# Patient Record
Sex: FEMALE | Race: WHITE | HISPANIC OR LATINO | Employment: OTHER | URBAN - METROPOLITAN AREA
[De-identification: names, ages, dates, MRNs, and addresses within clinical notes are randomized per-mention and may not be internally consistent; named-entity substitution may affect disease eponyms.]

---

## 2017-02-28 ENCOUNTER — APPOINTMENT (OUTPATIENT)
Dept: PHYSICAL THERAPY | Facility: CLINIC | Age: 57
End: 2017-02-28
Payer: COMMERCIAL

## 2017-02-28 PROCEDURE — 97140 MANUAL THERAPY 1/> REGIONS: CPT

## 2017-02-28 PROCEDURE — 97110 THERAPEUTIC EXERCISES: CPT

## 2017-02-28 PROCEDURE — 97162 PT EVAL MOD COMPLEX 30 MIN: CPT

## 2017-03-01 ENCOUNTER — APPOINTMENT (OUTPATIENT)
Dept: PHYSICAL THERAPY | Facility: CLINIC | Age: 57
End: 2017-03-01
Payer: COMMERCIAL

## 2017-03-01 PROCEDURE — 97140 MANUAL THERAPY 1/> REGIONS: CPT

## 2017-03-01 PROCEDURE — 97014 ELECTRIC STIMULATION THERAPY: CPT

## 2017-03-01 PROCEDURE — G0283 ELEC STIM OTHER THAN WOUND: HCPCS

## 2017-03-01 PROCEDURE — 97110 THERAPEUTIC EXERCISES: CPT

## 2017-03-03 ENCOUNTER — APPOINTMENT (OUTPATIENT)
Dept: PHYSICAL THERAPY | Facility: CLINIC | Age: 57
End: 2017-03-03
Payer: COMMERCIAL

## 2017-03-03 PROCEDURE — 97110 THERAPEUTIC EXERCISES: CPT

## 2017-03-03 PROCEDURE — 97112 NEUROMUSCULAR REEDUCATION: CPT

## 2017-03-03 PROCEDURE — G0283 ELEC STIM OTHER THAN WOUND: HCPCS

## 2017-03-03 PROCEDURE — 97140 MANUAL THERAPY 1/> REGIONS: CPT

## 2017-03-03 PROCEDURE — 97014 ELECTRIC STIMULATION THERAPY: CPT

## 2017-03-06 ENCOUNTER — APPOINTMENT (OUTPATIENT)
Dept: PHYSICAL THERAPY | Facility: CLINIC | Age: 57
End: 2017-03-06
Payer: COMMERCIAL

## 2017-03-06 PROCEDURE — 97110 THERAPEUTIC EXERCISES: CPT

## 2017-03-06 PROCEDURE — G0283 ELEC STIM OTHER THAN WOUND: HCPCS

## 2017-03-06 PROCEDURE — 97140 MANUAL THERAPY 1/> REGIONS: CPT

## 2017-03-06 PROCEDURE — 97014 ELECTRIC STIMULATION THERAPY: CPT

## 2017-03-06 PROCEDURE — 97112 NEUROMUSCULAR REEDUCATION: CPT

## 2017-03-08 ENCOUNTER — APPOINTMENT (OUTPATIENT)
Dept: PHYSICAL THERAPY | Facility: CLINIC | Age: 57
End: 2017-03-08
Payer: COMMERCIAL

## 2017-03-08 PROCEDURE — 97140 MANUAL THERAPY 1/> REGIONS: CPT

## 2017-03-08 PROCEDURE — 97110 THERAPEUTIC EXERCISES: CPT

## 2017-03-08 PROCEDURE — 97112 NEUROMUSCULAR REEDUCATION: CPT

## 2017-03-08 PROCEDURE — 97014 ELECTRIC STIMULATION THERAPY: CPT

## 2017-03-08 PROCEDURE — G0283 ELEC STIM OTHER THAN WOUND: HCPCS

## 2017-03-09 ENCOUNTER — APPOINTMENT (OUTPATIENT)
Dept: PHYSICAL THERAPY | Facility: CLINIC | Age: 57
End: 2017-03-09
Payer: COMMERCIAL

## 2017-03-09 PROCEDURE — 97112 NEUROMUSCULAR REEDUCATION: CPT

## 2017-03-09 PROCEDURE — 97110 THERAPEUTIC EXERCISES: CPT

## 2017-03-09 PROCEDURE — G0283 ELEC STIM OTHER THAN WOUND: HCPCS

## 2017-03-09 PROCEDURE — 97140 MANUAL THERAPY 1/> REGIONS: CPT

## 2017-03-09 PROCEDURE — 97014 ELECTRIC STIMULATION THERAPY: CPT

## 2017-03-13 ENCOUNTER — APPOINTMENT (OUTPATIENT)
Dept: PHYSICAL THERAPY | Facility: CLINIC | Age: 57
End: 2017-03-13
Payer: COMMERCIAL

## 2017-03-13 PROCEDURE — 97014 ELECTRIC STIMULATION THERAPY: CPT

## 2017-03-13 PROCEDURE — 97110 THERAPEUTIC EXERCISES: CPT

## 2017-03-13 PROCEDURE — G0283 ELEC STIM OTHER THAN WOUND: HCPCS

## 2017-03-13 PROCEDURE — 97112 NEUROMUSCULAR REEDUCATION: CPT

## 2017-03-13 PROCEDURE — 97140 MANUAL THERAPY 1/> REGIONS: CPT

## 2017-03-15 ENCOUNTER — APPOINTMENT (OUTPATIENT)
Dept: PHYSICAL THERAPY | Facility: CLINIC | Age: 57
End: 2017-03-15
Payer: COMMERCIAL

## 2017-03-15 PROCEDURE — 97140 MANUAL THERAPY 1/> REGIONS: CPT

## 2017-03-15 PROCEDURE — 97110 THERAPEUTIC EXERCISES: CPT

## 2017-03-15 PROCEDURE — 97112 NEUROMUSCULAR REEDUCATION: CPT

## 2017-03-15 PROCEDURE — 97014 ELECTRIC STIMULATION THERAPY: CPT

## 2017-03-15 PROCEDURE — G0283 ELEC STIM OTHER THAN WOUND: HCPCS

## 2017-03-16 ENCOUNTER — APPOINTMENT (OUTPATIENT)
Dept: PHYSICAL THERAPY | Facility: CLINIC | Age: 57
End: 2017-03-16
Payer: COMMERCIAL

## 2017-03-16 PROCEDURE — 97112 NEUROMUSCULAR REEDUCATION: CPT

## 2017-03-16 PROCEDURE — 97110 THERAPEUTIC EXERCISES: CPT

## 2017-03-16 PROCEDURE — 97140 MANUAL THERAPY 1/> REGIONS: CPT

## 2017-03-20 ENCOUNTER — APPOINTMENT (OUTPATIENT)
Dept: PHYSICAL THERAPY | Facility: CLINIC | Age: 57
End: 2017-03-20
Payer: COMMERCIAL

## 2017-03-20 PROCEDURE — 97110 THERAPEUTIC EXERCISES: CPT

## 2017-03-20 PROCEDURE — 97112 NEUROMUSCULAR REEDUCATION: CPT

## 2017-03-20 PROCEDURE — 97140 MANUAL THERAPY 1/> REGIONS: CPT

## 2017-03-22 ENCOUNTER — APPOINTMENT (OUTPATIENT)
Dept: PHYSICAL THERAPY | Facility: CLINIC | Age: 57
End: 2017-03-22
Payer: COMMERCIAL

## 2017-03-22 PROCEDURE — 97110 THERAPEUTIC EXERCISES: CPT

## 2017-03-22 PROCEDURE — 97112 NEUROMUSCULAR REEDUCATION: CPT

## 2017-03-23 ENCOUNTER — APPOINTMENT (OUTPATIENT)
Dept: PHYSICAL THERAPY | Facility: CLINIC | Age: 57
End: 2017-03-23
Payer: COMMERCIAL

## 2017-03-23 PROCEDURE — G0283 ELEC STIM OTHER THAN WOUND: HCPCS

## 2017-03-23 PROCEDURE — 97014 ELECTRIC STIMULATION THERAPY: CPT

## 2017-03-23 PROCEDURE — 97112 NEUROMUSCULAR REEDUCATION: CPT

## 2017-03-23 PROCEDURE — 97110 THERAPEUTIC EXERCISES: CPT

## 2017-03-27 ENCOUNTER — APPOINTMENT (OUTPATIENT)
Dept: PHYSICAL THERAPY | Facility: CLINIC | Age: 57
End: 2017-03-27
Payer: COMMERCIAL

## 2017-03-27 PROCEDURE — 97112 NEUROMUSCULAR REEDUCATION: CPT

## 2017-03-27 PROCEDURE — 97140 MANUAL THERAPY 1/> REGIONS: CPT

## 2017-03-27 PROCEDURE — 97014 ELECTRIC STIMULATION THERAPY: CPT

## 2017-03-27 PROCEDURE — G0283 ELEC STIM OTHER THAN WOUND: HCPCS

## 2017-03-27 PROCEDURE — 97110 THERAPEUTIC EXERCISES: CPT

## 2017-03-29 ENCOUNTER — APPOINTMENT (OUTPATIENT)
Dept: PHYSICAL THERAPY | Facility: CLINIC | Age: 57
End: 2017-03-29
Payer: COMMERCIAL

## 2017-03-29 PROCEDURE — 97112 NEUROMUSCULAR REEDUCATION: CPT

## 2017-03-29 PROCEDURE — 97140 MANUAL THERAPY 1/> REGIONS: CPT

## 2017-03-29 PROCEDURE — 97110 THERAPEUTIC EXERCISES: CPT

## 2017-03-29 PROCEDURE — G0283 ELEC STIM OTHER THAN WOUND: HCPCS

## 2017-03-29 PROCEDURE — 97014 ELECTRIC STIMULATION THERAPY: CPT

## 2017-03-30 ENCOUNTER — APPOINTMENT (OUTPATIENT)
Dept: PHYSICAL THERAPY | Facility: CLINIC | Age: 57
End: 2017-03-30
Payer: COMMERCIAL

## 2017-03-30 PROCEDURE — 97010 HOT OR COLD PACKS THERAPY: CPT

## 2017-03-30 PROCEDURE — 97110 THERAPEUTIC EXERCISES: CPT

## 2017-03-30 PROCEDURE — 97112 NEUROMUSCULAR REEDUCATION: CPT

## 2017-03-30 PROCEDURE — 97140 MANUAL THERAPY 1/> REGIONS: CPT

## 2017-04-03 ENCOUNTER — APPOINTMENT (OUTPATIENT)
Dept: PHYSICAL THERAPY | Facility: CLINIC | Age: 57
End: 2017-04-03
Payer: COMMERCIAL

## 2017-04-03 PROCEDURE — 97140 MANUAL THERAPY 1/> REGIONS: CPT

## 2017-04-03 PROCEDURE — 97010 HOT OR COLD PACKS THERAPY: CPT

## 2017-04-03 PROCEDURE — 97112 NEUROMUSCULAR REEDUCATION: CPT

## 2017-04-03 PROCEDURE — 97110 THERAPEUTIC EXERCISES: CPT

## 2017-04-05 ENCOUNTER — APPOINTMENT (OUTPATIENT)
Dept: PHYSICAL THERAPY | Facility: CLINIC | Age: 57
End: 2017-04-05
Payer: COMMERCIAL

## 2017-04-06 ENCOUNTER — APPOINTMENT (OUTPATIENT)
Dept: PHYSICAL THERAPY | Facility: CLINIC | Age: 57
End: 2017-04-06
Payer: COMMERCIAL

## 2017-04-06 PROCEDURE — 97140 MANUAL THERAPY 1/> REGIONS: CPT

## 2017-04-06 PROCEDURE — G0283 ELEC STIM OTHER THAN WOUND: HCPCS

## 2017-04-06 PROCEDURE — 97014 ELECTRIC STIMULATION THERAPY: CPT

## 2017-04-06 PROCEDURE — 97110 THERAPEUTIC EXERCISES: CPT

## 2017-04-06 PROCEDURE — 97112 NEUROMUSCULAR REEDUCATION: CPT

## 2017-04-10 ENCOUNTER — APPOINTMENT (OUTPATIENT)
Dept: PHYSICAL THERAPY | Facility: CLINIC | Age: 57
End: 2017-04-10
Payer: COMMERCIAL

## 2017-04-10 ENCOUNTER — APPOINTMENT (EMERGENCY)
Dept: CT IMAGING | Facility: HOSPITAL | Age: 57
DRG: 342 | End: 2017-04-10
Payer: COMMERCIAL

## 2017-04-10 ENCOUNTER — HOSPITAL ENCOUNTER (INPATIENT)
Facility: HOSPITAL | Age: 57
LOS: 1 days | Discharge: HOME | DRG: 342 | End: 2017-04-11
Attending: EMERGENCY MEDICINE | Admitting: SURGERY
Payer: COMMERCIAL

## 2017-04-10 DIAGNOSIS — K35.80 ACUTE APPENDICITIS: Primary | ICD-10-CM

## 2017-04-10 LAB
ALBUMIN SERPL BCP-MCNC: 3.8 G/DL (ref 3.5–5)
ALP SERPL-CCNC: 86 U/L (ref 46–116)
ALT SERPL W P-5'-P-CCNC: 24 U/L (ref 12–78)
ANION GAP SERPL CALCULATED.3IONS-SCNC: 7 MMOL/L (ref 4–13)
AST SERPL W P-5'-P-CCNC: 38 U/L (ref 5–45)
BACTERIA UR QL AUTO: ABNORMAL /HPF
BASOPHILS # BLD AUTO: 0.01 THOUSANDS/ΜL (ref 0–0.1)
BASOPHILS NFR BLD AUTO: 0 % (ref 0–1)
BILIRUB SERPL-MCNC: 0.9 MG/DL (ref 0.2–1)
BILIRUB UR QL STRIP: NEGATIVE
BUN SERPL-MCNC: 8 MG/DL (ref 5–25)
CALCIUM SERPL-MCNC: 9.3 MG/DL (ref 8.3–10.1)
CHLORIDE SERPL-SCNC: 101 MMOL/L (ref 100–108)
CLARITY UR: CLEAR
CLARITY, POC: CLEAR
CO2 SERPL-SCNC: 30 MMOL/L (ref 21–32)
COLOR UR: YELLOW
COLOR, POC: YELLOW
CREAT SERPL-MCNC: 0.6 MG/DL (ref 0.6–1.3)
EOSINOPHIL # BLD AUTO: 0.12 THOUSAND/ΜL (ref 0–0.61)
EOSINOPHIL NFR BLD AUTO: 1 % (ref 0–6)
ERYTHROCYTE [DISTWIDTH] IN BLOOD BY AUTOMATED COUNT: 14.6 % (ref 11.6–15.1)
EXT BILIRUBIN, UA: NEGATIVE
EXT BLOOD URINE: NORMAL
EXT GLUCOSE, UA: NEGATIVE
EXT KETONES: NEGATIVE
EXT NITRITE, UA: NORMAL
EXT PH, UA: 6
EXT PROTEIN, UA: NEGATIVE
EXT SPECIFIC GRAVITY, UA: 1.01
EXT UROBILINOGEN: 0.2
GFR SERPL CREATININE-BSD FRML MDRD: >60 ML/MIN/1.73SQ M
GLUCOSE SERPL-MCNC: 93 MG/DL (ref 65–140)
GLUCOSE UR STRIP-MCNC: NEGATIVE MG/DL
HCT VFR BLD AUTO: 42.2 % (ref 34.8–46.1)
HGB BLD-MCNC: 13.6 G/DL (ref 11.5–15.4)
HGB UR QL STRIP.AUTO: ABNORMAL
KETONES UR STRIP-MCNC: NEGATIVE MG/DL
LACTATE SERPL-SCNC: 1 MMOL/L (ref 0.5–2)
LEUKOCYTE ESTERASE UR QL STRIP: ABNORMAL
LYMPHOCYTES # BLD AUTO: 2.77 THOUSANDS/ΜL (ref 0.6–4.47)
LYMPHOCYTES NFR BLD AUTO: 29 % (ref 14–44)
MCH RBC QN AUTO: 25.8 PG (ref 26.8–34.3)
MCHC RBC AUTO-ENTMCNC: 32.2 G/DL (ref 31.4–37.4)
MCV RBC AUTO: 80 FL (ref 82–98)
MONOCYTES # BLD AUTO: 0.63 THOUSAND/ΜL (ref 0.17–1.22)
MONOCYTES NFR BLD AUTO: 7 % (ref 4–12)
NEUTROPHILS # BLD AUTO: 5.92 THOUSANDS/ΜL (ref 1.85–7.62)
NEUTS SEG NFR BLD AUTO: 63 % (ref 43–75)
NITRITE UR QL STRIP: NEGATIVE
NON-SQ EPI CELLS URNS QL MICRO: ABNORMAL /HPF
PH UR STRIP.AUTO: 6 [PH] (ref 4.5–8)
PLATELET # BLD AUTO: 335 THOUSANDS/UL (ref 149–390)
PMV BLD AUTO: 10.1 FL (ref 8.9–12.7)
POTASSIUM SERPL-SCNC: 4.5 MMOL/L (ref 3.5–5.3)
PROT SERPL-MCNC: 8.4 G/DL (ref 6.4–8.2)
PROT UR STRIP-MCNC: NEGATIVE MG/DL
RBC # BLD AUTO: 5.27 MILLION/UL (ref 3.81–5.12)
RBC #/AREA URNS AUTO: ABNORMAL /HPF
SODIUM SERPL-SCNC: 138 MMOL/L (ref 136–145)
SP GR UR STRIP.AUTO: <=1.005 (ref 1–1.03)
UROBILINOGEN UR QL STRIP.AUTO: 0.2 E.U./DL
WBC # BLD AUTO: 9.45 THOUSAND/UL (ref 4.31–10.16)
WBC # BLD EST: NEGATIVE 10*3/UL
WBC #/AREA URNS AUTO: ABNORMAL /HPF

## 2017-04-10 PROCEDURE — 96361 HYDRATE IV INFUSION ADD-ON: CPT

## 2017-04-10 PROCEDURE — 96374 THER/PROPH/DIAG INJ IV PUSH: CPT

## 2017-04-10 PROCEDURE — 36415 COLL VENOUS BLD VENIPUNCTURE: CPT | Performed by: EMERGENCY MEDICINE

## 2017-04-10 PROCEDURE — 83605 ASSAY OF LACTIC ACID: CPT | Performed by: EMERGENCY MEDICINE

## 2017-04-10 PROCEDURE — 81001 URINALYSIS AUTO W/SCOPE: CPT | Performed by: EMERGENCY MEDICINE

## 2017-04-10 PROCEDURE — 74177 CT ABD & PELVIS W/CONTRAST: CPT

## 2017-04-10 PROCEDURE — 80053 COMPREHEN METABOLIC PANEL: CPT | Performed by: EMERGENCY MEDICINE

## 2017-04-10 PROCEDURE — 87086 URINE CULTURE/COLONY COUNT: CPT | Performed by: EMERGENCY MEDICINE

## 2017-04-10 PROCEDURE — 96375 TX/PRO/DX INJ NEW DRUG ADDON: CPT

## 2017-04-10 PROCEDURE — 81002 URINALYSIS NONAUTO W/O SCOPE: CPT | Performed by: EMERGENCY MEDICINE

## 2017-04-10 PROCEDURE — 99285 EMERGENCY DEPT VISIT HI MDM: CPT

## 2017-04-10 PROCEDURE — 85025 COMPLETE CBC W/AUTO DIFF WBC: CPT | Performed by: EMERGENCY MEDICINE

## 2017-04-10 RX ORDER — SODIUM CHLORIDE 9 MG/ML
125 INJECTION, SOLUTION INTRAVENOUS CONTINUOUS
Status: DISCONTINUED | OUTPATIENT
Start: 2017-04-10 | End: 2017-04-11 | Stop reason: HOSPADM

## 2017-04-10 RX ORDER — ONDANSETRON 2 MG/ML
4 INJECTION INTRAMUSCULAR; INTRAVENOUS EVERY 4 HOURS PRN
Status: DISCONTINUED | OUTPATIENT
Start: 2017-04-10 | End: 2017-04-11 | Stop reason: HOSPADM

## 2017-04-10 RX ORDER — ONDANSETRON 2 MG/ML
4 INJECTION INTRAMUSCULAR; INTRAVENOUS ONCE
Status: COMPLETED | OUTPATIENT
Start: 2017-04-10 | End: 2017-04-10

## 2017-04-10 RX ORDER — MORPHINE SULFATE 2 MG/ML
2 INJECTION, SOLUTION INTRAMUSCULAR; INTRAVENOUS
Status: DISCONTINUED | OUTPATIENT
Start: 2017-04-10 | End: 2017-04-11 | Stop reason: HOSPADM

## 2017-04-10 RX ADMIN — HYDROMORPHONE HYDROCHLORIDE 1 MG: 1 INJECTION, SOLUTION INTRAMUSCULAR; INTRAVENOUS; SUBCUTANEOUS at 21:30

## 2017-04-10 RX ADMIN — SODIUM CHLORIDE 1000 ML: 0.9 INJECTION, SOLUTION INTRAVENOUS at 20:02

## 2017-04-10 RX ADMIN — METRONIDAZOLE 500 MG: 500 INJECTION, SOLUTION INTRAVENOUS at 23:04

## 2017-04-10 RX ADMIN — HYDROMORPHONE HYDROCHLORIDE 1 MG: 1 INJECTION, SOLUTION INTRAMUSCULAR; INTRAVENOUS; SUBCUTANEOUS at 20:05

## 2017-04-10 RX ADMIN — IOHEXOL 100 ML: 350 INJECTION, SOLUTION INTRAVENOUS at 20:55

## 2017-04-10 RX ADMIN — ONDANSETRON 4 MG: 2 INJECTION INTRAMUSCULAR; INTRAVENOUS at 20:02

## 2017-04-10 RX ADMIN — SODIUM CHLORIDE 125 ML/HR: 0.9 INJECTION, SOLUTION INTRAVENOUS at 22:16

## 2017-04-10 RX ADMIN — CEFAZOLIN SODIUM 2000 MG: 10 INJECTION, POWDER, FOR SOLUTION INTRAVENOUS at 21:36

## 2017-04-11 ENCOUNTER — ANESTHESIA EVENT (INPATIENT)
Dept: PERIOP | Facility: HOSPITAL | Age: 57
DRG: 342 | End: 2017-04-11
Payer: COMMERCIAL

## 2017-04-11 ENCOUNTER — ANESTHESIA (INPATIENT)
Dept: PERIOP | Facility: HOSPITAL | Age: 57
DRG: 342 | End: 2017-04-11
Payer: COMMERCIAL

## 2017-04-11 VITALS
SYSTOLIC BLOOD PRESSURE: 142 MMHG | RESPIRATION RATE: 18 BRPM | BODY MASS INDEX: 41.68 KG/M2 | TEMPERATURE: 98.6 F | HEIGHT: 63 IN | HEART RATE: 91 BPM | DIASTOLIC BLOOD PRESSURE: 82 MMHG | WEIGHT: 235.23 LBS | OXYGEN SATURATION: 95 %

## 2017-04-11 PROBLEM — K35.80 APPENDICITIS, ACUTE: Status: ACTIVE | Noted: 2017-04-11

## 2017-04-11 LAB
ANION GAP SERPL CALCULATED.3IONS-SCNC: 7 MMOL/L (ref 4–13)
BACTERIA UR CULT: NORMAL
BUN SERPL-MCNC: 9 MG/DL (ref 5–25)
CALCIUM SERPL-MCNC: 8.5 MG/DL (ref 8.3–10.1)
CHLORIDE SERPL-SCNC: 107 MMOL/L (ref 100–108)
CO2 SERPL-SCNC: 27 MMOL/L (ref 21–32)
CREAT SERPL-MCNC: 0.62 MG/DL (ref 0.6–1.3)
ERYTHROCYTE [DISTWIDTH] IN BLOOD BY AUTOMATED COUNT: 14.6 % (ref 11.6–15.1)
GFR SERPL CREATININE-BSD FRML MDRD: >60 ML/MIN/1.73SQ M
GLUCOSE SERPL-MCNC: 108 MG/DL (ref 65–140)
HCT VFR BLD AUTO: 34.7 % (ref 34.8–46.1)
HGB BLD-MCNC: 10.9 G/DL (ref 11.5–15.4)
MCH RBC QN AUTO: 25.6 PG (ref 26.8–34.3)
MCHC RBC AUTO-ENTMCNC: 31.4 G/DL (ref 31.4–37.4)
MCV RBC AUTO: 82 FL (ref 82–98)
PLATELET # BLD AUTO: 270 THOUSANDS/UL (ref 149–390)
PMV BLD AUTO: 10 FL (ref 8.9–12.7)
POTASSIUM SERPL-SCNC: 3.9 MMOL/L (ref 3.5–5.3)
RBC # BLD AUTO: 4.25 MILLION/UL (ref 3.81–5.12)
SODIUM SERPL-SCNC: 141 MMOL/L (ref 136–145)
WBC # BLD AUTO: 6.18 THOUSAND/UL (ref 4.31–10.16)

## 2017-04-11 PROCEDURE — 85027 COMPLETE CBC AUTOMATED: CPT | Performed by: PHYSICIAN ASSISTANT

## 2017-04-11 PROCEDURE — 0DTJ4ZZ RESECTION OF APPENDIX, PERCUTANEOUS ENDOSCOPIC APPROACH: ICD-10-PCS | Performed by: SURGERY

## 2017-04-11 PROCEDURE — 80048 BASIC METABOLIC PNL TOTAL CA: CPT | Performed by: PHYSICIAN ASSISTANT

## 2017-04-11 PROCEDURE — 88304 TISSUE EXAM BY PATHOLOGIST: CPT | Performed by: SURGERY

## 2017-04-11 RX ORDER — OXYCODONE HYDROCHLORIDE AND ACETAMINOPHEN 5; 325 MG/1; MG/1
2 TABLET ORAL EVERY 4 HOURS PRN
Status: DISCONTINUED | OUTPATIENT
Start: 2017-04-11 | End: 2017-04-11 | Stop reason: HOSPADM

## 2017-04-11 RX ORDER — PROPOFOL 10 MG/ML
INJECTION, EMULSION INTRAVENOUS AS NEEDED
Status: DISCONTINUED | OUTPATIENT
Start: 2017-04-11 | End: 2017-04-11 | Stop reason: SURG

## 2017-04-11 RX ORDER — MORPHINE SULFATE 4 MG/ML
4 INJECTION, SOLUTION INTRAMUSCULAR; INTRAVENOUS
Status: DISCONTINUED | OUTPATIENT
Start: 2017-04-11 | End: 2017-04-11 | Stop reason: HOSPADM

## 2017-04-11 RX ORDER — FENTANYL CITRATE/PF 50 MCG/ML
25 SYRINGE (ML) INJECTION
Status: DISCONTINUED | OUTPATIENT
Start: 2017-04-11 | End: 2017-04-11 | Stop reason: HOSPADM

## 2017-04-11 RX ORDER — LIDOCAINE HYDROCHLORIDE 10 MG/ML
INJECTION, SOLUTION INFILTRATION; PERINEURAL AS NEEDED
Status: DISCONTINUED | OUTPATIENT
Start: 2017-04-11 | End: 2017-04-11 | Stop reason: SURG

## 2017-04-11 RX ORDER — SODIUM CHLORIDE, SODIUM LACTATE, POTASSIUM CHLORIDE, CALCIUM CHLORIDE 600; 310; 30; 20 MG/100ML; MG/100ML; MG/100ML; MG/100ML
100 INJECTION, SOLUTION INTRAVENOUS CONTINUOUS
Status: DISCONTINUED | OUTPATIENT
Start: 2017-04-11 | End: 2017-04-11 | Stop reason: HOSPADM

## 2017-04-11 RX ORDER — OXYCODONE HYDROCHLORIDE AND ACETAMINOPHEN 5; 325 MG/1; MG/1
2 TABLET ORAL EVERY 4 HOURS PRN
Qty: 28 TABLET | Refills: 0 | Status: SHIPPED | OUTPATIENT
Start: 2017-04-11 | End: 2017-04-11

## 2017-04-11 RX ORDER — SODIUM CHLORIDE, SODIUM LACTATE, POTASSIUM CHLORIDE, CALCIUM CHLORIDE 600; 310; 30; 20 MG/100ML; MG/100ML; MG/100ML; MG/100ML
125 INJECTION, SOLUTION INTRAVENOUS CONTINUOUS
Status: DISCONTINUED | OUTPATIENT
Start: 2017-04-11 | End: 2017-04-11 | Stop reason: HOSPADM

## 2017-04-11 RX ORDER — ONDANSETRON 2 MG/ML
4 INJECTION INTRAMUSCULAR; INTRAVENOUS EVERY 4 HOURS PRN
Status: DISCONTINUED | OUTPATIENT
Start: 2017-04-11 | End: 2017-04-11 | Stop reason: SDUPTHER

## 2017-04-11 RX ORDER — FENTANYL CITRATE 50 UG/ML
INJECTION, SOLUTION INTRAMUSCULAR; INTRAVENOUS AS NEEDED
Status: DISCONTINUED | OUTPATIENT
Start: 2017-04-11 | End: 2017-04-11 | Stop reason: SURG

## 2017-04-11 RX ORDER — OXYCODONE HYDROCHLORIDE AND ACETAMINOPHEN 5; 325 MG/1; MG/1
2 TABLET ORAL EVERY 4 HOURS PRN
Qty: 20 TABLET | Refills: 0 | Status: SHIPPED | OUTPATIENT
Start: 2017-04-11 | End: 2019-07-19 | Stop reason: HOSPADM

## 2017-04-11 RX ORDER — BUPIVACAINE HYDROCHLORIDE AND EPINEPHRINE 2.5; 5 MG/ML; UG/ML
INJECTION, SOLUTION EPIDURAL; INFILTRATION; INTRACAUDAL; PERINEURAL AS NEEDED
Status: DISCONTINUED | OUTPATIENT
Start: 2017-04-11 | End: 2017-04-11 | Stop reason: HOSPADM

## 2017-04-11 RX ORDER — KETOROLAC TROMETHAMINE 30 MG/ML
INJECTION, SOLUTION INTRAMUSCULAR; INTRAVENOUS AS NEEDED
Status: DISCONTINUED | OUTPATIENT
Start: 2017-04-11 | End: 2017-04-11 | Stop reason: SURG

## 2017-04-11 RX ORDER — ONDANSETRON 2 MG/ML
INJECTION INTRAMUSCULAR; INTRAVENOUS AS NEEDED
Status: DISCONTINUED | OUTPATIENT
Start: 2017-04-11 | End: 2017-04-11 | Stop reason: SURG

## 2017-04-11 RX ORDER — ROCURONIUM BROMIDE 10 MG/ML
INJECTION, SOLUTION INTRAVENOUS AS NEEDED
Status: DISCONTINUED | OUTPATIENT
Start: 2017-04-11 | End: 2017-04-11 | Stop reason: SURG

## 2017-04-11 RX ORDER — ONDANSETRON 2 MG/ML
4 INJECTION INTRAMUSCULAR; INTRAVENOUS ONCE AS NEEDED
Status: DISCONTINUED | OUTPATIENT
Start: 2017-04-11 | End: 2017-04-11 | Stop reason: HOSPADM

## 2017-04-11 RX ORDER — GLYCOPYRROLATE 0.2 MG/ML
INJECTION INTRAMUSCULAR; INTRAVENOUS AS NEEDED
Status: DISCONTINUED | OUTPATIENT
Start: 2017-04-11 | End: 2017-04-11 | Stop reason: SURG

## 2017-04-11 RX ORDER — SODIUM CHLORIDE, SODIUM LACTATE, POTASSIUM CHLORIDE, CALCIUM CHLORIDE 600; 310; 30; 20 MG/100ML; MG/100ML; MG/100ML; MG/100ML
INJECTION, SOLUTION INTRAVENOUS CONTINUOUS PRN
Status: DISCONTINUED | OUTPATIENT
Start: 2017-04-11 | End: 2017-04-11 | Stop reason: SURG

## 2017-04-11 RX ORDER — ACETAMINOPHEN 325 MG/1
650 TABLET ORAL EVERY 6 HOURS PRN
Status: DISCONTINUED | OUTPATIENT
Start: 2017-04-11 | End: 2017-04-11 | Stop reason: HOSPADM

## 2017-04-11 RX ADMIN — SODIUM CHLORIDE, SODIUM LACTATE, POTASSIUM CHLORIDE, AND CALCIUM CHLORIDE: .6; .31; .03; .02 INJECTION, SOLUTION INTRAVENOUS at 14:54

## 2017-04-11 RX ADMIN — FENTANYL CITRATE 25 MCG: 50 INJECTION INTRAMUSCULAR; INTRAVENOUS at 16:47

## 2017-04-11 RX ADMIN — CEFAZOLIN SODIUM 2000 MG: 10 INJECTION, POWDER, FOR SOLUTION INTRAVENOUS at 05:03

## 2017-04-11 RX ADMIN — FENTANYL CITRATE 25 MCG: 50 INJECTION INTRAMUSCULAR; INTRAVENOUS at 16:37

## 2017-04-11 RX ADMIN — LIDOCAINE HYDROCHLORIDE 50 MG: 10 INJECTION, SOLUTION INFILTRATION; PERINEURAL at 15:04

## 2017-04-11 RX ADMIN — ONDANSETRON 4 MG: 2 INJECTION INTRAMUSCULAR; INTRAVENOUS at 14:13

## 2017-04-11 RX ADMIN — FENTANYL CITRATE 25 MCG: 50 INJECTION INTRAMUSCULAR; INTRAVENOUS at 17:00

## 2017-04-11 RX ADMIN — KETOROLAC TROMETHAMINE 30 MG: 30 INJECTION, SOLUTION INTRAMUSCULAR at 15:48

## 2017-04-11 RX ADMIN — FENTANYL CITRATE 50 MCG: 50 INJECTION INTRAMUSCULAR; INTRAVENOUS at 15:30

## 2017-04-11 RX ADMIN — CEFAZOLIN SODIUM 2000 MG: 2 SOLUTION INTRAVENOUS at 14:58

## 2017-04-11 RX ADMIN — DEXAMETHASONE SODIUM PHOSPHATE 4 MG: 10 INJECTION INTRAMUSCULAR; INTRAVENOUS at 15:10

## 2017-04-11 RX ADMIN — ROCURONIUM BROMIDE 50 MG: 10 INJECTION, SOLUTION INTRAVENOUS at 15:04

## 2017-04-11 RX ADMIN — PROPOFOL 200 MG: 10 INJECTION, EMULSION INTRAVENOUS at 15:04

## 2017-04-11 RX ADMIN — MORPHINE SULFATE 2 MG: 2 INJECTION, SOLUTION INTRAMUSCULAR; INTRAVENOUS at 01:46

## 2017-04-11 RX ADMIN — ONDANSETRON 4 MG: 2 INJECTION INTRAMUSCULAR; INTRAVENOUS at 15:10

## 2017-04-11 RX ADMIN — SODIUM CHLORIDE 125 ML/HR: 0.9 INJECTION, SOLUTION INTRAVENOUS at 07:44

## 2017-04-11 RX ADMIN — ACETAMINOPHEN 650 MG: 325 TABLET ORAL at 09:59

## 2017-04-11 RX ADMIN — FENTANYL CITRATE 50 MCG: 50 INJECTION INTRAMUSCULAR; INTRAVENOUS at 15:10

## 2017-04-11 RX ADMIN — NEOSTIGMINE METHYLSULFATE 4 MG: 1 INJECTION INTRAMUSCULAR; INTRAVENOUS; SUBCUTANEOUS at 15:48

## 2017-04-11 RX ADMIN — FENTANYL CITRATE 25 MCG: 50 INJECTION INTRAMUSCULAR; INTRAVENOUS at 16:50

## 2017-04-11 RX ADMIN — GLYCOPYRROLATE 0.8 MG: 0.2 INJECTION INTRAMUSCULAR; INTRAVENOUS at 15:48

## 2017-04-12 ENCOUNTER — APPOINTMENT (OUTPATIENT)
Dept: PHYSICAL THERAPY | Facility: CLINIC | Age: 57
End: 2017-04-12
Payer: COMMERCIAL

## 2017-04-13 ENCOUNTER — APPOINTMENT (OUTPATIENT)
Dept: PHYSICAL THERAPY | Facility: CLINIC | Age: 57
End: 2017-04-13
Payer: COMMERCIAL

## 2017-04-17 ENCOUNTER — APPOINTMENT (OUTPATIENT)
Dept: PHYSICAL THERAPY | Facility: CLINIC | Age: 57
End: 2017-04-17
Payer: COMMERCIAL

## 2017-04-19 ENCOUNTER — APPOINTMENT (OUTPATIENT)
Dept: PHYSICAL THERAPY | Facility: CLINIC | Age: 57
End: 2017-04-19
Payer: COMMERCIAL

## 2017-04-20 ENCOUNTER — APPOINTMENT (OUTPATIENT)
Dept: PHYSICAL THERAPY | Facility: CLINIC | Age: 57
End: 2017-04-20
Payer: COMMERCIAL

## 2017-04-24 ENCOUNTER — APPOINTMENT (OUTPATIENT)
Dept: PHYSICAL THERAPY | Facility: CLINIC | Age: 57
End: 2017-04-24
Payer: COMMERCIAL

## 2017-04-26 ENCOUNTER — APPOINTMENT (OUTPATIENT)
Dept: PHYSICAL THERAPY | Facility: CLINIC | Age: 57
End: 2017-04-26
Payer: COMMERCIAL

## 2017-04-27 ENCOUNTER — APPOINTMENT (OUTPATIENT)
Dept: PHYSICAL THERAPY | Facility: CLINIC | Age: 57
End: 2017-04-27
Payer: COMMERCIAL

## 2017-05-02 ENCOUNTER — APPOINTMENT (OUTPATIENT)
Dept: PHYSICAL THERAPY | Facility: CLINIC | Age: 57
End: 2017-05-02
Payer: COMMERCIAL

## 2017-05-02 PROCEDURE — 97140 MANUAL THERAPY 1/> REGIONS: CPT

## 2017-05-02 PROCEDURE — 97014 ELECTRIC STIMULATION THERAPY: CPT

## 2017-05-02 PROCEDURE — 97112 NEUROMUSCULAR REEDUCATION: CPT

## 2017-05-02 PROCEDURE — 97110 THERAPEUTIC EXERCISES: CPT

## 2017-05-02 PROCEDURE — G0283 ELEC STIM OTHER THAN WOUND: HCPCS

## 2017-05-09 ENCOUNTER — APPOINTMENT (OUTPATIENT)
Dept: PHYSICAL THERAPY | Facility: CLINIC | Age: 57
End: 2017-05-09
Payer: COMMERCIAL

## 2017-05-09 PROCEDURE — 97112 NEUROMUSCULAR REEDUCATION: CPT

## 2017-05-09 PROCEDURE — 97140 MANUAL THERAPY 1/> REGIONS: CPT

## 2017-05-09 PROCEDURE — 97110 THERAPEUTIC EXERCISES: CPT

## 2017-05-16 ENCOUNTER — APPOINTMENT (OUTPATIENT)
Dept: PHYSICAL THERAPY | Facility: CLINIC | Age: 57
End: 2017-05-16
Payer: COMMERCIAL

## 2017-05-16 PROCEDURE — 97110 THERAPEUTIC EXERCISES: CPT

## 2017-05-16 PROCEDURE — 97140 MANUAL THERAPY 1/> REGIONS: CPT

## 2017-05-23 ENCOUNTER — APPOINTMENT (OUTPATIENT)
Dept: PHYSICAL THERAPY | Facility: CLINIC | Age: 57
End: 2017-05-23
Payer: COMMERCIAL

## 2017-05-23 PROCEDURE — 97112 NEUROMUSCULAR REEDUCATION: CPT

## 2017-05-23 PROCEDURE — 97110 THERAPEUTIC EXERCISES: CPT

## 2019-07-15 ENCOUNTER — APPOINTMENT (EMERGENCY)
Dept: CT IMAGING | Facility: HOSPITAL | Age: 59
DRG: 076 | End: 2019-07-15
Payer: COMMERCIAL

## 2019-07-15 ENCOUNTER — HOSPITAL ENCOUNTER (INPATIENT)
Facility: HOSPITAL | Age: 59
LOS: 4 days | Discharge: NURSING, REHAB OR LONG-TERM CARE | DRG: 076 | End: 2019-07-19
Attending: EMERGENCY MEDICINE | Admitting: INTERNAL MEDICINE
Payer: COMMERCIAL

## 2019-07-15 ENCOUNTER — APPOINTMENT (EMERGENCY)
Dept: RADIOLOGY | Facility: HOSPITAL | Age: 59
DRG: 076 | End: 2019-07-15
Payer: COMMERCIAL

## 2019-07-15 DIAGNOSIS — B02.0 HERPES ZOSTER ENCEPHALITIS: ICD-10-CM

## 2019-07-15 DIAGNOSIS — A87.9 VIRAL MENINGITIS: ICD-10-CM

## 2019-07-15 DIAGNOSIS — B02.1 HERPES ZOSTER WITH MENINGITIS: ICD-10-CM

## 2019-07-15 DIAGNOSIS — R51.9 HEADACHE: ICD-10-CM

## 2019-07-15 DIAGNOSIS — B02.9 SHINGLES RASH: ICD-10-CM

## 2019-07-15 DIAGNOSIS — B02.9 HERPES ZOSTER: Primary | ICD-10-CM

## 2019-07-15 PROBLEM — G61.0 GUILLAIN BARRÉ SYNDROME (HCC): Status: ACTIVE | Noted: 2019-07-15

## 2019-07-15 PROBLEM — M43.6 NECK STIFFNESS: Status: ACTIVE | Noted: 2019-07-15

## 2019-07-15 PROBLEM — I10 ESSENTIAL HYPERTENSION: Chronic | Status: ACTIVE | Noted: 2019-07-15

## 2019-07-15 LAB
ALBUMIN SERPL BCP-MCNC: 3.8 G/DL (ref 3.5–5)
ALP SERPL-CCNC: 84 U/L (ref 46–116)
ALT SERPL W P-5'-P-CCNC: 17 U/L (ref 12–78)
ANION GAP SERPL CALCULATED.3IONS-SCNC: 8 MMOL/L (ref 4–13)
APPEARANCE CSF: ABNORMAL
APTT PPP: 29 SECONDS (ref 23–37)
AST SERPL W P-5'-P-CCNC: 14 U/L (ref 5–45)
BASOPHILS # BLD AUTO: 0.01 THOUSANDS/ΜL (ref 0–0.1)
BASOPHILS NFR BLD AUTO: 0 % (ref 0–1)
BILIRUB SERPL-MCNC: 0.8 MG/DL (ref 0.2–1)
BILIRUB UR QL STRIP: NEGATIVE
BUN SERPL-MCNC: 8 MG/DL (ref 5–25)
C GATTII+NEOFOR DNA CSF QL NAA+NON-PROBE: NOT DETECTED
CALCIUM SERPL-MCNC: 9.5 MG/DL (ref 8.3–10.1)
CHLORIDE SERPL-SCNC: 100 MMOL/L (ref 100–108)
CLARITY UR: CLEAR
CMV DNA CSF QL NAA+NON-PROBE: NOT DETECTED
CO2 SERPL-SCNC: 28 MMOL/L (ref 21–32)
COLOR UR: NORMAL
CREAT SERPL-MCNC: 0.9 MG/DL (ref 0.6–1.3)
DEPRECATED D DIMER PPP: 1356 NG/ML (FEU)
E COLI K1 DNA CSF QL NAA+NON-PROBE: NOT DETECTED
EOSINOPHIL # BLD AUTO: 0.02 THOUSAND/ΜL (ref 0–0.61)
EOSINOPHIL NFR BLD AUTO: 0 % (ref 0–6)
ERYTHROCYTE [DISTWIDTH] IN BLOOD BY AUTOMATED COUNT: 14.7 % (ref 11.6–15.1)
EV RNA CSF QL NAA+NON-PROBE: NOT DETECTED
GFR SERPL CREATININE-BSD FRML MDRD: 71 ML/MIN/1.73SQ M
GLUCOSE CSF-MCNC: 52 MG/DL (ref 50–80)
GLUCOSE SERPL-MCNC: 108 MG/DL (ref 65–140)
GLUCOSE UR STRIP-MCNC: NEGATIVE MG/DL
GP B STREP DNA CSF QL NAA+NON-PROBE: NOT DETECTED
GRAM STN SPEC: NORMAL
HAEM INFLU DNA CSF QL NAA+NON-PROBE: NOT DETECTED
HCT VFR BLD AUTO: 42.7 % (ref 34.8–46.1)
HGB BLD-MCNC: 13.5 G/DL (ref 11.5–15.4)
HGB UR QL STRIP.AUTO: NEGATIVE
HHV6 DNA CSF QL NAA+NON-PROBE: NOT DETECTED
HSV1 DNA CSF QL NAA+NON-PROBE: NOT DETECTED
HSV2 DNA CSF QL NAA+NON-PROBE: NOT DETECTED
IMM GRANULOCYTES # BLD AUTO: 0.01 THOUSAND/UL (ref 0–0.2)
IMM GRANULOCYTES NFR BLD AUTO: 0 % (ref 0–2)
INR PPP: 1.01 (ref 0.84–1.19)
KETONES UR STRIP-MCNC: NEGATIVE MG/DL
L MONOCYTOG DNA CSF QL NAA+NON-PROBE: NOT DETECTED
LEUKOCYTE ESTERASE UR QL STRIP: NEGATIVE
LYMPHOCYTES # BLD AUTO: 1.44 THOUSANDS/ΜL (ref 0.6–4.47)
LYMPHOCYTES NFR BLD AUTO: 24 % (ref 14–44)
LYMPHOCYTES NFR CSF MANUAL: 90 %
MCH RBC QN AUTO: 26.7 PG (ref 26.8–34.3)
MCHC RBC AUTO-ENTMCNC: 31.6 G/DL (ref 31.4–37.4)
MCV RBC AUTO: 84 FL (ref 82–98)
MONOCYTES # BLD AUTO: 0.51 THOUSAND/ΜL (ref 0.17–1.22)
MONOCYTES NFR BLD AUTO: 9 % (ref 4–12)
MONOS+MACROS CSF MANUAL: 10 %
N MEN DNA CSF QL NAA+NON-PROBE: NOT DETECTED
NEUTROPHILS # BLD AUTO: 3.91 THOUSANDS/ΜL (ref 1.85–7.62)
NEUTS SEG NFR BLD AUTO: 67 % (ref 43–75)
NITRITE UR QL STRIP: NEGATIVE
NRBC BLD AUTO-RTO: 0 /100 WBCS
PARECHOVIRUS A RNA CSF QL NAA+NON-PROBE: NOT DETECTED
PH UR STRIP.AUTO: 7.5 [PH]
PLATELET # BLD AUTO: 276 THOUSANDS/UL (ref 149–390)
PMV BLD AUTO: 9 FL (ref 8.9–12.7)
POTASSIUM SERPL-SCNC: 3.3 MMOL/L (ref 3.5–5.3)
PROT CSF-MCNC: 88 MG/DL (ref 15–45)
PROT SERPL-MCNC: 8 G/DL (ref 6.4–8.2)
PROT UR STRIP-MCNC: NEGATIVE MG/DL
PROTHROMBIN TIME: 12.7 SECONDS (ref 11.6–14.5)
RBC # BLD AUTO: 5.06 MILLION/UL (ref 3.81–5.12)
RBC # CSF MANUAL: 13 UL (ref 0–10)
RBC # CSF MANUAL: 94 UL (ref 0–10)
S PNEUM DNA CSF QL NAA+NON-PROBE: NOT DETECTED
SODIUM SERPL-SCNC: 136 MMOL/L (ref 136–145)
SP GR UR STRIP.AUTO: 1.01 (ref 1–1.03)
TOTAL CELLS COUNTED BLD: NO
TOTAL CELLS COUNTED SPEC: 100
TROPONIN I SERPL-MCNC: <0.02 NG/ML
TUBE # CSF: 4
UROBILINOGEN UR QL STRIP.AUTO: 0.2 E.U./DL
VZV DNA CSF QL NAA+NON-PROBE: DETECTED
WBC # BLD AUTO: 5.9 THOUSAND/UL (ref 4.31–10.16)
WBC # CSF AUTO: 179 /UL (ref 0–5)

## 2019-07-15 PROCEDURE — 71046 X-RAY EXAM CHEST 2 VIEWS: CPT

## 2019-07-15 PROCEDURE — 87070 CULTURE OTHR SPECIMN AEROBIC: CPT | Performed by: EMERGENCY MEDICINE

## 2019-07-15 PROCEDURE — 93005 ELECTROCARDIOGRAM TRACING: CPT

## 2019-07-15 PROCEDURE — 96374 THER/PROPH/DIAG INJ IV PUSH: CPT

## 2019-07-15 PROCEDURE — 70460 CT HEAD/BRAIN W/DYE: CPT

## 2019-07-15 PROCEDURE — 89051 BODY FLUID CELL COUNT: CPT | Performed by: EMERGENCY MEDICINE

## 2019-07-15 PROCEDURE — 84157 ASSAY OF PROTEIN OTHER: CPT | Performed by: EMERGENCY MEDICINE

## 2019-07-15 PROCEDURE — 99223 1ST HOSP IP/OBS HIGH 75: CPT | Performed by: INTERNAL MEDICINE

## 2019-07-15 PROCEDURE — 85379 FIBRIN DEGRADATION QUANT: CPT | Performed by: EMERGENCY MEDICINE

## 2019-07-15 PROCEDURE — 81003 URINALYSIS AUTO W/O SCOPE: CPT | Performed by: EMERGENCY MEDICINE

## 2019-07-15 PROCEDURE — 99291 CRITICAL CARE FIRST HOUR: CPT | Performed by: EMERGENCY MEDICINE

## 2019-07-15 PROCEDURE — 99285 EMERGENCY DEPT VISIT HI MDM: CPT

## 2019-07-15 PROCEDURE — 71275 CT ANGIOGRAPHY CHEST: CPT

## 2019-07-15 PROCEDURE — 89050 BODY FLUID CELL COUNT: CPT | Performed by: EMERGENCY MEDICINE

## 2019-07-15 PROCEDURE — 96361 HYDRATE IV INFUSION ADD-ON: CPT

## 2019-07-15 PROCEDURE — 87040 BLOOD CULTURE FOR BACTERIA: CPT | Performed by: EMERGENCY MEDICINE

## 2019-07-15 PROCEDURE — 85025 COMPLETE CBC W/AUTO DIFF WBC: CPT | Performed by: EMERGENCY MEDICINE

## 2019-07-15 PROCEDURE — 009U3ZX DRAINAGE OF SPINAL CANAL, PERCUTANEOUS APPROACH, DIAGNOSTIC: ICD-10-PCS | Performed by: EMERGENCY MEDICINE

## 2019-07-15 PROCEDURE — 85610 PROTHROMBIN TIME: CPT | Performed by: EMERGENCY MEDICINE

## 2019-07-15 PROCEDURE — 96376 TX/PRO/DX INJ SAME DRUG ADON: CPT

## 2019-07-15 PROCEDURE — 80053 COMPREHEN METABOLIC PANEL: CPT | Performed by: EMERGENCY MEDICINE

## 2019-07-15 PROCEDURE — 70450 CT HEAD/BRAIN W/O DYE: CPT

## 2019-07-15 PROCEDURE — 84484 ASSAY OF TROPONIN QUANT: CPT | Performed by: EMERGENCY MEDICINE

## 2019-07-15 PROCEDURE — 96375 TX/PRO/DX INJ NEW DRUG ADDON: CPT

## 2019-07-15 PROCEDURE — 36415 COLL VENOUS BLD VENIPUNCTURE: CPT | Performed by: EMERGENCY MEDICINE

## 2019-07-15 PROCEDURE — 82945 GLUCOSE OTHER FLUID: CPT | Performed by: EMERGENCY MEDICINE

## 2019-07-15 PROCEDURE — 62270 DX LMBR SPI PNXR: CPT | Performed by: EMERGENCY MEDICINE

## 2019-07-15 PROCEDURE — 87483 CNS DNA AMP PROBE TYPE 12-25: CPT | Performed by: EMERGENCY MEDICINE

## 2019-07-15 PROCEDURE — 85730 THROMBOPLASTIN TIME PARTIAL: CPT | Performed by: EMERGENCY MEDICINE

## 2019-07-15 RX ORDER — NAPROXEN 500 MG/1
1 TABLET ORAL EVERY 12 HOURS
COMMUNITY
End: 2019-07-19 | Stop reason: HOSPADM

## 2019-07-15 RX ORDER — ACETAMINOPHEN 325 MG/1
650 TABLET ORAL EVERY 6 HOURS PRN
Status: DISCONTINUED | OUTPATIENT
Start: 2019-07-15 | End: 2019-07-16

## 2019-07-15 RX ORDER — LISINOPRIL 10 MG/1
1 TABLET ORAL DAILY
COMMUNITY
End: 2019-07-19 | Stop reason: HOSPADM

## 2019-07-15 RX ORDER — HYDROMORPHONE HCL/PF 1 MG/ML
0.5 SYRINGE (ML) INJECTION ONCE
Status: COMPLETED | OUTPATIENT
Start: 2019-07-15 | End: 2019-07-15

## 2019-07-15 RX ORDER — SODIUM CHLORIDE 9 MG/ML
75 INJECTION, SOLUTION INTRAVENOUS CONTINUOUS
Status: DISCONTINUED | OUTPATIENT
Start: 2019-07-15 | End: 2019-07-16

## 2019-07-15 RX ORDER — HYDROMORPHONE HCL/PF 1 MG/ML
0.5 SYRINGE (ML) INJECTION ONCE AS NEEDED
Status: DISCONTINUED | OUTPATIENT
Start: 2019-07-15 | End: 2019-07-16

## 2019-07-15 RX ORDER — HYDROMORPHONE HCL/PF 1 MG/ML
0.2 SYRINGE (ML) INJECTION EVERY 6 HOURS PRN
Status: DISCONTINUED | OUTPATIENT
Start: 2019-07-15 | End: 2019-07-15

## 2019-07-15 RX ORDER — CYCLOBENZAPRINE HCL 10 MG
10 TABLET ORAL 3 TIMES DAILY PRN
Status: DISCONTINUED | OUTPATIENT
Start: 2019-07-15 | End: 2019-07-19 | Stop reason: HOSPADM

## 2019-07-15 RX ORDER — OMEPRAZOLE 20 MG/1
20 CAPSULE, DELAYED RELEASE ORAL DAILY
COMMUNITY

## 2019-07-15 RX ORDER — PANTOPRAZOLE SODIUM 40 MG/1
40 TABLET, DELAYED RELEASE ORAL
Status: DISCONTINUED | OUTPATIENT
Start: 2019-07-16 | End: 2019-07-19 | Stop reason: HOSPADM

## 2019-07-15 RX ORDER — LIDOCAINE HYDROCHLORIDE 20 MG/ML
10 INJECTION, SOLUTION EPIDURAL; INFILTRATION; INTRACAUDAL; PERINEURAL ONCE
Status: COMPLETED | OUTPATIENT
Start: 2019-07-15 | End: 2019-07-15

## 2019-07-15 RX ORDER — CITALOPRAM 20 MG/1
1 TABLET ORAL DAILY
COMMUNITY

## 2019-07-15 RX ORDER — LISINOPRIL 10 MG/1
10 TABLET ORAL DAILY
Status: DISCONTINUED | OUTPATIENT
Start: 2019-07-16 | End: 2019-07-19 | Stop reason: HOSPADM

## 2019-07-15 RX ORDER — ONDANSETRON 2 MG/ML
4 INJECTION INTRAMUSCULAR; INTRAVENOUS ONCE
Status: COMPLETED | OUTPATIENT
Start: 2019-07-15 | End: 2019-07-15

## 2019-07-15 RX ORDER — HYDROXYZINE HYDROCHLORIDE 25 MG/1
25 TABLET, FILM COATED ORAL
Status: DISCONTINUED | OUTPATIENT
Start: 2019-07-15 | End: 2019-07-19 | Stop reason: HOSPADM

## 2019-07-15 RX ORDER — FENTANYL CITRATE 50 UG/ML
100 INJECTION, SOLUTION INTRAMUSCULAR; INTRAVENOUS ONCE
Status: COMPLETED | OUTPATIENT
Start: 2019-07-15 | End: 2019-07-15

## 2019-07-15 RX ORDER — HYDROXYZINE HYDROCHLORIDE 25 MG/1
1 TABLET, FILM COATED ORAL
COMMUNITY

## 2019-07-15 RX ORDER — CYCLOBENZAPRINE HCL 10 MG
1 TABLET ORAL 3 TIMES DAILY PRN
COMMUNITY

## 2019-07-15 RX ORDER — OXYCODONE HYDROCHLORIDE 5 MG/1
5 TABLET ORAL EVERY 4 HOURS PRN
Status: DISCONTINUED | OUTPATIENT
Start: 2019-07-15 | End: 2019-07-16

## 2019-07-15 RX ORDER — HYDROMORPHONE HCL/PF 1 MG/ML
0.2 SYRINGE (ML) INJECTION
Status: DISCONTINUED | OUTPATIENT
Start: 2019-07-15 | End: 2019-07-16

## 2019-07-15 RX ORDER — CITALOPRAM 20 MG/1
20 TABLET ORAL DAILY
Status: DISCONTINUED | OUTPATIENT
Start: 2019-07-16 | End: 2019-07-19 | Stop reason: HOSPADM

## 2019-07-15 RX ORDER — OXYCODONE HYDROCHLORIDE 5 MG/1
2.5 TABLET ORAL EVERY 4 HOURS PRN
Status: DISCONTINUED | OUTPATIENT
Start: 2019-07-15 | End: 2019-07-16

## 2019-07-15 RX ADMIN — HYDROXYZINE HYDROCHLORIDE 25 MG: 25 TABLET ORAL at 21:37

## 2019-07-15 RX ADMIN — HYDROMORPHONE HYDROCHLORIDE 0.5 MG: 1 INJECTION, SOLUTION INTRAMUSCULAR; INTRAVENOUS; SUBCUTANEOUS at 14:39

## 2019-07-15 RX ADMIN — HYDROMORPHONE HYDROCHLORIDE 0.5 MG: 1 INJECTION, SOLUTION INTRAMUSCULAR; INTRAVENOUS; SUBCUTANEOUS at 16:11

## 2019-07-15 RX ADMIN — ONDANSETRON 4 MG: 2 INJECTION INTRAMUSCULAR; INTRAVENOUS at 14:39

## 2019-07-15 RX ADMIN — OXYCODONE HYDROCHLORIDE 5 MG: 5 TABLET ORAL at 21:37

## 2019-07-15 RX ADMIN — LIDOCAINE HYDROCHLORIDE 10 ML: 20 INJECTION, SOLUTION EPIDURAL; INFILTRATION; INTRACAUDAL; PERINEURAL at 15:42

## 2019-07-15 RX ADMIN — FENTANYL CITRATE 100 MCG: 50 INJECTION, SOLUTION INTRAMUSCULAR; INTRAVENOUS at 17:36

## 2019-07-15 RX ADMIN — HYDROMORPHONE HYDROCHLORIDE 0.5 MG: 1 INJECTION, SOLUTION INTRAMUSCULAR; INTRAVENOUS; SUBCUTANEOUS at 19:19

## 2019-07-15 RX ADMIN — ACYCLOVIR SODIUM 525 MG: 50 INJECTION, SOLUTION INTRAVENOUS at 17:39

## 2019-07-15 RX ADMIN — SODIUM CHLORIDE 75 ML/HR: 0.9 INJECTION, SOLUTION INTRAVENOUS at 20:00

## 2019-07-15 RX ADMIN — SODIUM CHLORIDE 1000 ML: 0.9 INJECTION, SOLUTION INTRAVENOUS at 14:05

## 2019-07-15 RX ADMIN — IOHEXOL 100 ML: 350 INJECTION, SOLUTION INTRAVENOUS at 15:12

## 2019-07-15 RX ADMIN — ONDANSETRON 4 MG: 2 INJECTION INTRAMUSCULAR; INTRAVENOUS at 19:18

## 2019-07-15 NOTE — ED NOTES
PT back from [address] [place] [address], [address] [place] [address]  07/15/19 7738

## 2019-07-15 NOTE — ASSESSMENT & PLAN NOTE
Rash visible on LUQ of abdomen, sensitive to touch  Unable to see reported rash on back due to patient's sensitivity to movements  Continue treatment with IV Acyclovir 10mg/kg q8h and monitor patient  Follow up with BMP tomorrow AM to assess kidney function  Acetominophen, Oxyocodone, and Dilaudid for mild/moderate, severe, and breakthrough pain respectively

## 2019-07-15 NOTE — ASSESSMENT & PLAN NOTE
/67  Currently managed with Lisinopril 10 mg  Continue to monitor blood pressure and adjust dosage if necessary

## 2019-07-15 NOTE — ED NOTES
Tried to give report to ICU but told couldn't take my report due to them giving report     Callum Chen, GIULIANA  07/15/19 0414

## 2019-07-15 NOTE — ED PROVIDER NOTES
History  Chief Complaint   Patient presents with    Headache     Pt  with severe headache x 1 week  Per pt  yesterday headache intensified  Noted possible shingles left abdomen  Pt  reports "lungs hurt when I walk "     61-year-old female presents to the emergency department for evaluation of headache, back and lung pain, and rash  Patient states that she started to feel unwell last Monday 1 week ago  At that time she had mild headache  On Tuesday her headache increased and she developed a painful rash along the left low back and left low abdomen  The rash is exquisitely painful and she cannot find a comfortable position to lie down  Patient states that her headache worsened on Thursday 4 days ago  Yesterday she had significant pain that she did not feel like she could move  Her daughter is at the bedside to help provide history  She states that the patient has been complaining of severe headache and has been having nausea  She has been eating less than normal but has been drinking a normal amount of fluids  The patient has not had fevers  She went to her PCP today for evaluation suspected that her blood pressure may be triggering her headache however her blood pressure was within normal limits  Patient has a history of breast cancer and had a bilateral mastectomy  She states that she is not currently receiving treatment but was told that she would never be cured of her cancer  She has a history of Guillaine Muir syndrome and reports some damage to nerves in her eyes that causes her to have fatigue when reading  Patient's daughter states the patient has seemed out of it but has not notice focal neurologic deficits  Patient feels generalized fatigue  She has been feeling dizzy when she ambulates  Today she is having severe "lung" pain that feels deep in her back and lungs and is intensified by movements and deep breaths          History provided by:  Patient, relative and medical records   used: No    Medical Problem   Location:  Headache, neck pain and painful rash left flank  Quality:  Rash along left flank is severely pain  Severity:  Severe  Onset quality:  Gradual  Duration:  1 week  Timing:  Constant  Progression:  Worsening  Chronicity:  New  Context:  Symptoms slowly progressed over 1 week  Relieved by:  Nothing  Worsened by: Movement  Ineffective treatments:  Over-the-counter medication  Associated symptoms: fatigue, headaches, nausea, rash and shortness of breath    Associated symptoms: no congestion, no cough, no ear pain, no fever and no vomiting        Prior to Admission Medications   Prescriptions Last Dose Informant Patient Reported?  Taking?   citalopram (CELEXA) 20 mg tablet   Yes Yes   Sig: Take 1 tablet by mouth daily   cyclobenzaprine (FLEXERIL) 10 mg tablet   Yes Yes   Sig: Take 1 tablet by mouth 3 (three) times a day as needed   hydrOXYzine HCL (ATARAX) 25 mg tablet   Yes Yes   Sig: Take 1 tablet by mouth   linaCLOtide (LINZESS) 145 MCG CAPS   Yes Yes   Sig: Take by mouth   lisinopril (ZESTRIL) 10 mg tablet   Yes Yes   Sig: Take 1 tablet by mouth daily   naproxen (NAPROSYN) 500 mg tablet   Yes Yes   Sig: Take 1 tablet by mouth every 12 (twelve) hours   omeprazole (PriLOSEC) 20 mg delayed release capsule   Yes Yes   Sig: Take 20 mg/mL by mouth daily   oxyCODONE-acetaminophen (PERCOCET) 5-325 mg per tablet   No Yes   Sig: Take 2 tablets by mouth every 4 (four) hours as needed for moderate pain for up to 20 doses Max Daily Amount: 12 tablets      Facility-Administered Medications: None       Past Medical History:   Diagnosis Date    Breast cancer ([facility] )     RIGHT    Cancer ([facility] )     Guillain Barré syndrome ([facility] )     Hypertension        Past Surgical History:   Procedure Laterality Date    MASTECTOMY, RADICAL Bilateral     AL LAP,APPENDECTOMY N/A 4/11/2017    Procedure: APPENDECTOMY LAPAROSCOPIC;  Surgeon: Bin Mckeon DO;  Location: [facility]; Service: General    TOTAL KNEE ARTHROPLASTY Right        History reviewed  No pertinent family history  I have reviewed and agree with the history as documented  Social History     Tobacco Use    Smoking status: Never Smoker    Smokeless tobacco: Never Used   Substance Use Topics    Alcohol use: No    Drug use: Never        Review of Systems   Constitutional: Positive for appetite change, chills and fatigue  Negative for fever  HENT: Negative for congestion, ear pain and sinus pain  Eyes: Positive for photophobia, pain and visual disturbance  Respiratory: Positive for shortness of breath  Negative for cough and chest tightness  Cardiovascular: Positive for palpitations  Negative for leg swelling  Gastrointestinal: Positive for nausea  Negative for vomiting  Genitourinary: Negative for dysuria  Musculoskeletal: Positive for back pain and neck pain  Skin: Positive for rash  Neurological: Positive for dizziness, weakness, light-headedness and headaches  Negative for tremors, seizures, syncope and speech difficulty  Psychiatric/Behavioral: Positive for dysphoric mood  All other systems reviewed and are negative  Physical Exam  Physical Exam   Constitutional: She is oriented to person, place, and time  Vital signs are normal  She appears well-developed and well-nourished  She appears listless  She appears ill  She appears distressed  HENT:   Head: Normocephalic and atraumatic  Right Ear: Tympanic membrane normal    Left Ear: Tympanic membrane normal    Nose: Nose normal  No mucosal edema  Mouth/Throat: Uvula is midline and oropharynx is clear and moist  Mucous membranes are dry  No oropharyngeal exudate, posterior oropharyngeal edema or posterior oropharyngeal erythema  No tonsillar exudate  Eyes: Pupils are equal, round, and reactive to light  EOM and lids are normal  Right conjunctiva is injected  Left conjunctiva is injected   Right eye exhibits normal extraocular motion and no nystagmus  Left eye exhibits normal extraocular motion and no nystagmus  Neck: Trachea normal  Neck supple  Spinous process tenderness and muscular tenderness present  Decreased range of motion present  No edema and no erythema present  Kernig's sign noted  No Brudzinski's sign noted  Cardiovascular: Normal rate, regular rhythm, normal heart sounds and intact distal pulses  No extrasystoles are present  Pulmonary/Chest: Effort normal  No respiratory distress  She has decreased breath sounds in the right lower field and the left lower field  She has no wheezes  She has no rhonchi  Splints with deep inspiration     Abdominal: Soft  Bowel sounds are normal  She exhibits no distension  There is tenderness in the left lower quadrant  There is no rebound and no guarding  Musculoskeletal: She exhibits no edema or deformity  Thoracic back: She exhibits tenderness  She exhibits normal range of motion and no bony tenderness  Lumbar back: She exhibits decreased range of motion and tenderness  She exhibits no bony tenderness  Back:    Lymphadenopathy:     She has no cervical adenopathy  Neurological: She is oriented to person, place, and time  She has normal strength and normal reflexes  She appears listless  No cranial nerve deficit or sensory deficit  She exhibits normal muscle tone  Coordination normal    Skin: Skin is warm, dry and intact  Rash noted  Rash is vesicular  Psychiatric: She has a normal mood and affect   Her behavior is normal  Judgment and thought content normal        Vital Signs  ED Triage Vitals [07/15/19 1327]   Temperature Pulse Respirations Blood Pressure SpO2   99 °F (37 2 °C) 77 18 140/62 98 %      Temp Source Heart Rate Source Patient Position - Orthostatic VS BP Location FiO2 (%)   Oral Monitor Lying Right arm --      Pain Score       Worst Possible Pain           Vitals:    07/16/19 2300 07/17/19 0700 07/17/19 0912 07/17/19 1549   BP: 108/58 116/56 114/62 115/58   Pulse: 70 55  62   Patient Position - Orthostatic VS: Lying   Lying         Visual Acuity      ED Medications  Medications   lisinopril (ZESTRIL) tablet 10 mg (10 mg Oral Given 7/17/19 0912)   citalopram (CeleXA) tablet 20 mg (20 mg Oral Given 7/17/19 0912)   enoxaparin (LOVENOX) subcutaneous injection 40 mg (40 mg Subcutaneous Given 7/17/19 0912)   cyclobenzaprine (FLEXERIL) tablet 10 mg (10 mg Oral Given 7/16/19 1945)   hydrOXYzine HCL (ATARAX) tablet 25 mg (25 mg Oral Given 7/16/19 2201)   linaCLOtide CAPS 145 mcg (0 mcg Oral Hold 7/17/19 0917)   acyclovir (ZOVIRAX) 675 mg in sodium chloride 0 9 % 100 mL IVPB (0 mg Intravenous Stopped 7/17/19 1014)   pantoprazole (PROTONIX) EC tablet 40 mg (40 mg Oral Given 7/17/19 0551)   acetaminophen (TYLENOL) tablet 650 mg (650 mg Oral Given 7/17/19 1143)   oxyCODONE (ROXICODONE) IR tablet 2 5 mg ( Oral See Alternative 7/17/19 0920)     Or   oxyCODONE (ROXICODONE) IR tablet 5 mg (5 mg Oral Not Given 7/17/19 0920)   HYDROmorphone (DILAUDID) injection 0 2 mg (0 2 mg Intravenous Given 7/16/19 1945)   senna (SENOKOT) tablet 17 2 mg (17 2 mg Oral Given 7/17/19 1343)   bisacodyl (DULCOLAX) rectal suppository 10 mg (has no administration in time range)   sodium chloride 0 9 % bolus 1,000 mL (0 mL Intravenous Stopped 7/15/19 1916)   ondansetron (ZOFRAN) injection 4 mg (4 mg Intravenous Given 7/15/19 1439)   HYDROmorphone (DILAUDID) injection 0 5 mg (0 5 mg Intravenous Given 7/15/19 1439)   iohexol (OMNIPAQUE) 350 MG/ML injection (MULTI-DOSE) 100 mL (100 mL Intravenous Given 7/15/19 1512)   lidocaine (PF) (XYLOCAINE-MPF) 2 % injection 10 mL (10 mL Infiltration Given 7/15/19 1542)   HYDROmorphone (DILAUDID) injection 0 5 mg (0 5 mg Intravenous Given 7/15/19 1611)   fentanyl citrate (PF) 100 MCG/2ML 100 mcg (100 mcg Intravenous Given 7/15/19 1736)   acyclovir (ZOVIRAX) 525 mg in sodium chloride 0 9 % 100 mL IVPB (0 mg/kg × 52 4 kg (Ideal) Intravenous Stopped 7/15/19 1916)   ondansetron (ZOFRAN) injection 4 mg (4 mg Intravenous Given 7/15/19 1918)   HYDROmorphone (DILAUDID) injection 0 5 mg (0 5 mg Intravenous Given 7/15/19 1919)       Diagnostic Studies  Results Reviewed     Procedure Component Value Units Date/Time    CSF, Culture and Gram stain (Tube 3) [[id number]] Collected:  07/15/19 1645    Lab Status:  Preliminary result Specimen:  Cerebrospinal Fluid from Lumbar Puncture Updated:  07/17/19 1018     CSF Culture No growth    Blood culture #1 [[id number]] Collected:  07/15/19 1406    Lab Status:  Preliminary result Specimen:  Blood from Arm, Left Updated:  07/16/19 2002     Blood Culture No Growth at 24 hrs  Blood culture #2 [[id number]] Collected:  07/15/19 1414    Lab Status:  Preliminary result Specimen:  Blood from Arm, Left Updated:  07/16/19 2002     Blood Culture No Growth at 24 hrs  Meningitis/Encephalitis (ME) Panel [[id number]]  (Abnormal) Collected:  07/15/19 1645    Lab Status:  Final result Specimen:  Cerebrospinal Fluid from Lumbar Puncture Updated:  07/15/19 2017     C  NEOFORMANS/GATTII Not Detected     CYTOMEGALOVIRUS Not Detected     ENTEROVIRUS Not Detected     E COLI K1 Not Detected     H INFLUENZAE Not Detected     H SIMPLEX 1 Not Detected     H SIMPLEX 2 Not Detected     HERPES VIRUS 6 Not Detected     PARECHOVIRUS Not Detected     L  MONOCYTOGENES Not Detected     N MENINGITIDIS Not Detected     S AGALACTIAE Not Detected     S  PNEUMONIAE Not Detected     V ZOSTER Detected    CSF, Gram Stain (Tube 3) [[id number]] Collected:  07/15/19 1645    Lab Status:  Final result Specimen:  Cerebrospinal Fluid from Lumbar Puncture Updated:  07/15/19 1832     Gram Stain Result 2+ Mononuclear Cells      No Polys      No No bacteria seen    CSF, White cell count with differential (Tube 4) [[id number]]  (Abnormal) Collected:  07/15/19 1645    Lab Status:  Final result Specimen:  Cerebrospinal Fluid from Lumbar Puncture Updated:  07/15/19 1821     Appearance, CSF Clear and Colorless     Tube Number, CSF 4     WBC,  /uL      Xanthochromia No    CSF Diff [[id number]] Collected:  07/15/19 1645    Lab Status:  Final result Specimen:  Cerebrospinal Fluid from Lumbar Puncture Updated:  07/15/19 1821     Total Counted 100     Lymphs % CSF 90 %      Monocytes % (CSF) 10 %     CSF, RBC count (Tube 4) [[id number]]  (Abnormal) Collected:  07/15/19 1645    Lab Status:  Final result Specimen:  Cerebrospinal Fluid from Lumbar Puncture Updated:  07/15/19 1808     RBC, CSF 13 uL     CSF, RBC count (Tube 1) [[id number]]  (Abnormal) Collected:  07/15/19 1645    Lab Status:  Final result Specimen:  Cerebrospinal Fluid from Lumbar Puncture Updated:  07/15/19 1808     RBC, CSF 94 uL     CSF, Glucose (Tube 2) [[id number]]  (Normal) Collected:  07/15/19 1645    Lab Status:  Final result Specimen:  Cerebrospinal Fluid from Lumbar Puncture Updated:  07/15/19 1657     Glucose, CSF 52 mg/dL     CSF, Total Protein (Tube 2) [[id number]]  (Abnormal) Collected:  07/15/19 1645    Lab Status:  Final result Specimen:  Cerebrospinal Fluid from Lumbar Puncture Updated:  07/15/19 1657     Protein, CSF 88 mg/dL     UA w Reflex to Microscopic w Reflex to Culture [[id number]] Collected:  07/15/19 1438    Lab Status:  Final result Specimen:  Urine, Clean Catch Updated:  07/15/19 1444     Color, UA Light Yellow     Clarity, UA Clear     Specific Gravity, UA 1 010     pH, UA 7 5     Leukocytes, UA Negative     Nitrite, UA Negative     Protein, UA Negative mg/dl      Glucose, UA Negative mg/dl      Ketones, UA Negative mg/dl      Urobilinogen, UA 0 2 E U /dl      Bilirubin, UA Negative     Blood, UA Negative    Troponin I [[id number]]  (Normal) Collected:  07/15/19 1406    Lab Status:  Final result Specimen:  Blood from Arm, Left Updated:  07/15/19 1438     Troponin I <0 02 ng/mL     Comprehensive metabolic panel [[id number]]  (Abnormal) Collected:  07/15/19 1406    Lab Status:  Final result Specimen:  Blood from Arm, Left Updated:  07/15/19 1435     Sodium 136 mmol/L      Potassium 3 3 mmol/L      Chloride 100 mmol/L      CO2 28 mmol/L      ANION GAP 8 mmol/L      BUN 8 mg/dL      Creatinine 0 90 mg/dL      Glucose 108 mg/dL      Calcium 9 5 mg/dL      AST 14 U/L      ALT 17 U/L      Alkaline Phosphatase 84 U/L      Total Protein 8 0 g/dL      Albumin 3 8 g/dL      Total Bilirubin 0 80 mg/dL      eGFR 71 ml/min/1 73sq m     Narrative:       [place] guidelines for Chronic Kidney Disease (CKD):     Stage 1 with normal or high GFR (GFR > 90 mL/min/1 73 square meters)    Stage 2 Mild CKD (GFR = 60-89 mL/min/1 73 square meters)    Stage 3A Moderate CKD (GFR = 45-59 mL/min/1 73 square meters)    Stage 3B Moderate CKD (GFR = 30-44 mL/min/1 73 square meters)    Stage 4 Severe CKD (GFR = 15-29 mL/min/1 73 square meters)    Stage 5 End Stage CKD (GFR <15 mL/min/1 73 square meters)  Note: GFR calculation is accurate only with a steady state creatinine    D-Dimer [[id number]]  (Abnormal) Collected:  07/15/19 1406    Lab Status:  Final result Specimen:  Blood from Arm, Left Updated:  07/15/19 1434     D-Dimer, Quant 1,356 ng/ml (FEU)     Protime-INR [[id number]]  (Normal) Collected:  07/15/19 1406    Lab Status:  Final result Specimen:  Blood from Arm, Left Updated:  07/15/19 1428     Protime 12 7 seconds      INR 1 01    APTT [[id number]]  (Normal) Collected:  07/15/19 1406    Lab Status:  Final result Specimen:  Blood from Arm, Left Updated:  07/15/19 1428     PTT 29 seconds     CBC and differential [[id number]]  (Abnormal) Collected:  07/15/19 1406    Lab Status:  Final result Specimen:  Blood from Arm, Left Updated:  07/15/19 1419     WBC 5 90 Thousand/uL      RBC 5 06 Million/uL      Hemoglobin 13 5 g/dL      Hematocrit 42 7 %      MCV 84 fL      MCH 26 7 pg      MCHC 31 6 g/dL      RDW 14 7 %      MPV 9 0 fL      Platelets 312 Thousands/uL      nRBC 0 /100 WBCs      Neutrophils Relative 67 %      Immat GRANS % 0 %      Lymphocytes Relative 24 %      Monocytes Relative 9 %      Eosinophils Relative 0 %      Basophils Relative 0 %      Neutrophils Absolute 3 91 Thousands/µL      Immature Grans Absolute 0 01 Thousand/uL      Lymphocytes Absolute 1 44 Thousands/µL      Monocytes Absolute 0 51 Thousand/µL      Eosinophils Absolute 0 02 Thousand/µL      Basophils Absolute 0 01 Thousands/µL                  CTA ED chest PE study   Final Result by Mei Givens MD (07/15 1542)      No definite evidence for pulmonary embolism  Right lower lobe linear atelectasis is noted  Workstation performed: [id number]         CT head w contrast   Final Result by Carson Lazcano MD (07/15 9208)      No acute pathology  Workstation performed: [id number]         CT head without contrast   Final Result by Carson Lazcano MD (07/15 1448)      No acute intracranial abnormality  Workstation performed: [id number]         XR chest 2 views   ED Interpretation by Bharath Raphael DO (07/15 1421)   No acute disease      Final Result by Sami Hernandez MD (07/15 1442)      No acute cardiopulmonary disease              Workstation performed: [id number]                    Procedures  ECG 12 Lead Documentation Only  Date/Time: 7/15/2019 2:05 PM  Performed by: Bharath Raphael DO  Authorized by: Bharath Raphael DO     Indications / Diagnosis:  Lung pain  ECG reviewed by me, the ED Provider: yes    Patient location:  ED  Previous ECG:     Previous ECG:  Unavailable  Interpretation:     Interpretation: non-specific    Quality:     Tracing quality:  Limited by artifact  Rate:     ECG rate:  64    ECG rate assessment: normal    Rhythm:     Rhythm: sinus rhythm    Ectopy:     Ectopy: none    QRS:     QRS axis:  Normal  Conduction:     Conduction: normal    ST segments:     ST segments:  Normal  T waves:     T waves: non-specific    Lumbar puncture  Date/Time: 7/15/2019 4:53 PM  Performed by: Bharath Raphael DO  Authorized by: Bharath Raphael DO     Patient location:  ED  Other Assisting Provider: Yes (comment) (BARBARA Velásquez)    Consent:     Consent obtained:  Verbal and written    Consent given by:  Patient    Risks discussed:  Infection, pain, repeat procedure, headache and bleeding  Universal protocol:     Procedure explained and questions answered to patient or proxy's satisfaction: yes      Immediately prior to procedure a time out was called: yes      Patient identity confirmed:  Verbally with patient  Pre-procedure details:     Preparation: Patient was prepped and draped in usual sterile fashion    Indications:     Indications: evaluation for infection    Anesthesia (see MAR for exact dosages): Anesthesia method:  Local infiltration    Local anesthetic:  Lidocaine 2% w/o epi  Procedure details:     Lumbar space:  L3-L4 interspace    Patient position:  Sitting    Equipment: Lumbar puncture kit used      Needle gauge:  20G x 3 5in    Needle type:  Spinal needle - Quincke tip    Number of attempts:  2    Fluid appearance:  Blood-tinged then clearing and clear    Tubes of fluid:  4    Total volume (ml):  5  Post-procedure:     Puncture site:  Adhesive bandage applied    Patient tolerance of procedure:   Tolerated well, no immediate complications    Patient instructed to lie flat for one(1) hour post lumbar puncture : Yes    CriticalCare Time  Performed by: Diane Donato DO  Authorized by: Diane Donato DO     Critical care provider statement:     Critical care time (minutes):  30    Critical care was necessary to treat or prevent imminent or life-threatening deterioration of the following conditions:  Sepsis    Critical care was time spent personally by me on the following activities:  Blood draw for specimens, obtaining history from patient or surrogate, development of treatment plan with patient or surrogate, discussions with consultants, evaluation of patient's response to treatment, examination of patient, review of old charts, re-evaluation of patient's condition, ordering and review of radiographic studies, ordering and review of laboratory studies and ordering and performing treatments and interventions    I assumed direction of critical care for this patient from another provider in my specialty: no             ED Course  ED Course as of Jul 17 1626   Mon Jul 15, 2019   1419 Case discussed with Dr Torres Andrade of Infectious Disease - she recommends LP with meningitis/encephalitis panel after CT brain with contrast      1423 Patient's chest x-ray reviewed by me and appears within normal limits  Patient is at CT scan now for CT head without contrast   I am waiting for D-dimer result before ordering the CT scan of the head with contrast because patient may need to have CTA of chest  if d-dimer is elevated        1824 CSF, White cell count with differential (Tube 4)(!!)   1824 CSF, White cell count with differential (Tube 4)(!!)                         Wells' Criteria for PE      Most Recent Value   Wells' Criteria for PE   Clinical signs and symptoms of DVT  0 Filed at: 07/15/2019 1342   PE is primary diagnosis or equally likely  0 Filed at: 07/15/2019 1342   HR >100  0 Filed at: 07/15/2019 1342   Immobilization at least 3 days or Surgery in the previous 4 weeks  0 Filed at: 07/15/2019 1342   Previous, objectively diagnosed PE or DVT  0 Filed at: 07/15/2019 1342   Hemoptysis  0 Filed at: 07/15/2019 1342   Malignancy with treatment within 6 months or palliative  1 [pt  reports having breast cancer that "will never be in remission" but is not currently receiving treatment] Filed at: 07/15/2019 1342   Wells' Criteria Total  1 Filed at: 07/15/2019 1342            MDM  Number of Diagnoses or Management Options  Headache: new and requires workup  Herpes zoster encephalitis:   Herpes zoster: new and requires workup     Amount and/or Complexity of Data Reviewed  Clinical lab tests: ordered and reviewed  Tests in the radiology section of CPT®: reviewed and ordered  Tests in the medicine section of CPT®: ordered and reviewed  Decide to obtain previous medical records or to obtain history from someone other than the patient: yes  Obtain history from someone other than the patient: yes  Discuss the patient with other providers: yes  Independent visualization of images, tracings, or specimens: yes    Patient Progress  Patient progress: stable      Disposition  Final diagnoses:   Herpes zoster   Headache   Herpes zoster encephalitis     Time reflects when diagnosis was documented in both MDM as applicable and the Disposition within this note     Time User Action Codes Description Comment    7/15/2019  5:49 PM Christie Baldwin Add [B02 9] Herpes zoster     7/15/2019  5:50 PM Christie Baldwin Add [R51] Headache     7/15/2019  7:28 PM ParammalikdavonSarahh Add [B02 9] Shingles rash     7/15/2019  9:00 PM Saurabh Odonnell Add [A87 9] Viral meningitis     7/17/2019  4:25 PM Christie Baldwin Add [B02 0] Herpes zoster encephalitis       ED Disposition     ED Disposition Condition Date/Time Comment    Admit Stable Mon Jul 15, 2019  5:48 PM Case was discussed with Dr Meryle Cartwright and the patient's admission status was agreed to be Admission Status: inpatient status to the service of Dr Meryle Cartwright           Follow-up Information    None         Current Discharge Medication List      CONTINUE these medications which have NOT CHANGED    Details   citalopram (CELEXA) 20 mg tablet Take 1 tablet by mouth daily      cyclobenzaprine (FLEXERIL) 10 mg tablet Take 1 tablet by mouth 3 (three) times a day as needed      hydrOXYzine HCL (ATARAX) 25 mg tablet Take 1 tablet by mouth      linaCLOtide (LINZESS) 145 MCG CAPS Take by mouth      lisinopril (ZESTRIL) 10 mg tablet Take 1 tablet by mouth daily      naproxen (NAPROSYN) 500 mg tablet Take 1 tablet by mouth every 12 (twelve) hours      omeprazole (PriLOSEC) 20 mg delayed release capsule Take 20 mg/mL by mouth daily      oxyCODONE-acetaminophen (PERCOCET) 5-325 mg per tablet Take 2 tablets by mouth every 4 (four) hours as needed for moderate pain for up to 20 doses Max Daily Amount: 12 tablets  Qty: 20 tablet, Refills: 0    Comments: Ongoing therapy           No discharge procedures on file      ED Provider  Electronically Signed by           Dahlia Brady DO  07/17/19 0320

## 2019-07-15 NOTE — ASSESSMENT & PLAN NOTE
Kernig and Brudzinski signs positive  CT of head with and without contrast was negative for any malformations  LP in ER showed evidence of viral etiology - likely Varicella Zoster  Infectious Diseases has been consulted  No fever  Headache associated with limited ROM of head and neck  Pain medications PRN  Associated with Nausea - Zofran  Continue treatment with IV Acyclovir 10mg/kg q8h and monitor patient  Follow up with BMP tomorrow AM to assess kidney function

## 2019-07-15 NOTE — PROGRESS NOTES
Progress Note - Malgorzata Mccoy 1960, 64 y o  female MRN: [id number]    Unit/Bed#:  Encounter: [id number]    Primary Care Provider: Chris Streeter MD   Date and time admitted to hospital: 7/15/2019  1:18 PM        * Viral meningitis  Assessment & Plan  Kernig and Brudzinski signs positive  CT of head with and without contrast was negative for any malformations  LP in ER showed evidence of viral etiology - likely Varicella Zoster  Infectious Diseases has been consulted  No fever  Headache associated with limited ROM of head and neck  Pain medications PRN  Associated with Nausea - Zofran  Continue treatment with IV Acyclovir 10mg/kg q8h and monitor patient  Follow up with BMP tomorrow AM to assess kidney function    Shingles rash  Assessment & Plan  Rash visible on LUQ of abdomen, sensitive to touch  Unable to see reported rash on back due to patient's sensitivity to movements  Continue treatment with IV Acyclovir 10mg/kg q8h and monitor patient  Follow up with BMP tomorrow AM to assess kidney function  Acetominophen, Oxyocodone, and Dilaudid for mild/moderate, severe, and breakthrough pain respectively    Essential hypertension  Assessment & Plan  /64  Currently managed with Lisinopril 10 mg  Continue to monitor BP and adjust dosage if necessary    VTE Prophylaxis: Enoxaparin (Lovenox)  / sequential compression device   Code Status: FULL  POLST: POLST form is not discussed and not completed at this time  Discussion with family: Discussed with family friend present at bedside with consent of patient    Anticipated Length of Stay:  Patient will be admitted on an Inpatient basis with an anticipated length of stay of greater than 2 midnights  Justification for Hospital Stay: Viral Meningitis infection    Total Time for Visit, including Counseling / Coordination of Care: 70 minutes  Greater than 50% of this total time spent on direct patient counseling and coordination of care      Chief Complaint: Headache and neck stiffness    History of Present Illness:    Clyde Myers is a 64 y o  female with past medical history significant for Guillain-Cresskill syndrome who presents with an 8/10 headache that she has been experiencing for the past 5 days  Patient states that she took regular strength Tylenol, which initially helped  However, as the weekend progressed, the symptoms did not subside and she began to experience neck stiffness  Patient also stated that she had a rash located just under her left breast and a similar rash on the left side of her posterior thorax  In the ER, a CT of the head without contrast showed no acute intracranial abnormality and a CT of the head with contrast showed no acute pathology  A lumbar puncture was performed in the ER that showed evidence of viral etiology  Review of Systems:    Review of Systems   Constitutional: Positive for activity change, appetite change and chills  Negative for fever  Eyes: Positive for photophobia and pain  Pain "behind the eyes"   Respiratory: Negative for cough and shortness of breath  Gastrointestinal: Positive for nausea  Negative for vomiting  Neurological: Positive for headaches  Negative for dizziness, weakness and light-headedness  Psychiatric/Behavioral: Negative for confusion  All other systems reviewed and are negative  Past Medical and Surgical History:     Past Medical History:   Diagnosis Date    [facility] [address] )     RIGHT    Cancer ([facility] [address] )     [facility]illain Barré syndrome ([facility] )     Hypertension        Past Surgical History:   Procedure Laterality Date    MASTECTOMY, RADICAL Bilateral     [place] LAP,APPENDECTOMY N/A 4/11/2017    Procedure: APPENDECTOMY LAPAROSCOPIC;  Surgeon: Antoni Prasad DO;  Location: [facility];  Service: General    TOTAL KNEE ARTHROPLASTY Right        Meds/Allergies:    Prior to Admission medications    Medication Sig Start Date End Date Taking?  Authorizing Provider   citalopram (CELEXA) 20 mg tablet Take 1 tablet by mouth daily   Yes Historical Provider, MD   cyclobenzaprine (FLEXERIL) 10 mg tablet Take 1 tablet by mouth 3 (three) times a day as needed   Yes Historical Provider, MD   hydrOXYzine HCL (ATARAX) 25 mg tablet Take 1 tablet by mouth   Yes Historical Provider, MD   linaCLOtide (LINZESS) 145 MCG CAPS Take by mouth   Yes Historical Provider, MD   lisinopril (ZESTRIL) 10 mg tablet Take 1 tablet by mouth daily   Yes Historical Provider, MD   naproxen (NAPROSYN) 500 mg tablet Take 1 tablet by mouth every 12 (twelve) hours   Yes Historical Provider, MD   omeprazole (PriLOSEC) 20 mg delayed release capsule Take 20 mg/mL by mouth daily   Yes Historical Provider, MD   oxyCODONE-acetaminophen (PERCOCET) 5-325 mg per tablet Take 2 tablets by mouth every 4 (four) hours as needed for moderate pain for up to 20 doses Max Daily Amount: 12 tablets 4/11/17  Yes Josh Darling, DO     I have reviewed home medications with patient personally  Allergies: No Known Allergies    Social History:     Marital Status: /Civil Union   Patient Pre-hospital Level of Mobility: Normal  Patient Pre-hospital Diet Restrictions: None  Substance Use History:   Social History     Substance and Sexual Activity   Alcohol Use No     Social History     Tobacco Use   Smoking Status Never Smoker   Smokeless Tobacco Never Used     Social History     Substance and Sexual Activity   Drug Use Never       Family History:    History reviewed  No pertinent family history  Physical Exam:     Vitals: Pulse at bedside: 60 bpm  Blood Pressure: 122/53 (07/15/19 1955)  Pulse: (!) 54 (07/15/19 1955)  Temperature: 97 8 °F (36 6 °C) (07/15/19 1955)  Temp Source: Oral (07/15/19 1955)  Respirations: 20 (07/15/19 1955)  Height: 5' 3" (160 cm) (07/15/19 1955)  Weight - Scale: 88 kg (194 lb 0 1 oz) (07/15/19 1955)  SpO2: 100 % (07/15/19 1955)    Physical Exam   Constitutional: She is oriented to person, place, and time   Vital signs are normal  She appears well-developed and well-nourished  She appears lethargic  She is cooperative  She appears distressed  HENT:   Head: Normocephalic and atraumatic  Eyes: Pupils are equal, round, and reactive to light  Conjunctivae and EOM are normal    Neck: Neck rigidity present  Decreased range of motion present  No edema present  Brudzinski's sign and Kernig's sign noted  Cardiovascular: Normal rate, regular rhythm, S1 normal, S2 normal, normal heart sounds and intact distal pulses  Exam reveals no gallop and no friction rub  No murmur heard  Pulmonary/Chest: Effort normal and breath sounds normal  No accessory muscle usage  No respiratory distress  She has no wheezes  She has no rales  Abdominal: Soft  Bowel sounds are normal  She exhibits no distension and no mass  There is tenderness  Tenderness only on palpation near the rash   Musculoskeletal: She exhibits no edema, tenderness or deformity  Lymphadenopathy:     She has no cervical adenopathy  Neurological: She is oriented to person, place, and time  She appears lethargic  No sensory deficit  Skin: Skin is warm, dry and intact  Rash noted  Psychiatric: She has a normal mood and affect  Her behavior is normal    Nursing note and vitals reviewed  Additional Data:     Lab Results: I have personally reviewed pertinent reports     and I have personally reviewed pertinent films in PACS    Results from last 7 days   Lab Units 07/15/19  1406   WBC Thousand/uL 5 90   HEMOGLOBIN g/dL 13 5   HEMATOCRIT % 42 7   PLATELETS Thousands/uL 276   NEUTROS PCT % 67   LYMPHS PCT % 24   MONOS PCT % 9   EOS PCT % 0     Results from last 7 days   Lab Units 07/15/19  1406   SODIUM mmol/L 136   POTASSIUM mmol/L 3 3*   CHLORIDE mmol/L 100   CO2 mmol/L 28   BUN mg/dL 8   CREATININE mg/dL 0 90   ANION GAP mmol/L 8   CALCIUM mg/dL 9 5   ALBUMIN g/dL 3 8   TOTAL BILIRUBIN mg/dL 0 80   ALK PHOS U/L 84   ALT U/L 17   AST U/L 14   GLUCOSE RANDOM mg/dL 108     Results from last 7 days   Lab Units 07/15/19  1406   INR  1 01                   Imaging: I have personally reviewed pertinent reports  CTA ED chest PE study   Final Result by Peggy Fishman MD (07/15 1542)      No definite evidence for pulmonary embolism  Right lower lobe linear atelectasis is noted  Workstation performed: [id number]         CT head w contrast   Final Result by Zia Swanson MD (07/15 3628)      No acute pathology  Workstation performed: [id number]         CT head without contrast   Final Result by Zia Swanson MD (07/15 1448)      No acute intracranial abnormality  Workstation performed: [id number]         XR chest 2 views   ED Interpretation by John Garrett DO (07/15 1421)   No acute disease      Final Result by Justin Purcell MD (07/15 1442)      No acute cardiopulmonary disease  Workstation performed: [id number]             EKG, Pathology, and Other Studies Reviewed on Admission:   · EKG: NSR with nonspecific T wave changes    Allscripts / Epic Records Reviewed: Yes     ** Please Note: This note has been constructed using a voice recognition system   **

## 2019-07-16 PROBLEM — B02.1 HERPES ZOSTER WITH MENINGITIS: Status: ACTIVE | Noted: 2019-07-15

## 2019-07-16 LAB
ANION GAP SERPL CALCULATED.3IONS-SCNC: 5 MMOL/L (ref 4–13)
ATRIAL RATE: 64 BPM
BUN SERPL-MCNC: 6 MG/DL (ref 5–25)
CALCIUM SERPL-MCNC: 8.3 MG/DL (ref 8.3–10.1)
CHLORIDE SERPL-SCNC: 103 MMOL/L (ref 100–108)
CO2 SERPL-SCNC: 29 MMOL/L (ref 21–32)
CREAT SERPL-MCNC: 0.83 MG/DL (ref 0.6–1.3)
ERYTHROCYTE [DISTWIDTH] IN BLOOD BY AUTOMATED COUNT: 14.5 % (ref 11.6–15.1)
GFR SERPL CREATININE-BSD FRML MDRD: 79 ML/MIN/1.73SQ M
GLUCOSE SERPL-MCNC: 109 MG/DL (ref 65–140)
HCT VFR BLD AUTO: 37.6 % (ref 34.8–46.1)
HGB BLD-MCNC: 11.8 G/DL (ref 11.5–15.4)
MCH RBC QN AUTO: 27 PG (ref 26.8–34.3)
MCHC RBC AUTO-ENTMCNC: 31.4 G/DL (ref 31.4–37.4)
MCV RBC AUTO: 86 FL (ref 82–98)
P AXIS: 53 DEGREES
PLATELET # BLD AUTO: 219 THOUSANDS/UL (ref 149–390)
PMV BLD AUTO: 9.2 FL (ref 8.9–12.7)
POTASSIUM SERPL-SCNC: 3.6 MMOL/L (ref 3.5–5.3)
PR INTERVAL: 132 MS
QRS AXIS: -16 DEGREES
QRSD INTERVAL: 78 MS
QT INTERVAL: 394 MS
QTC INTERVAL: 406 MS
RBC # BLD AUTO: 4.37 MILLION/UL (ref 3.81–5.12)
SODIUM SERPL-SCNC: 137 MMOL/L (ref 136–145)
T WAVE AXIS: 16 DEGREES
VENTRICULAR RATE: 64 BPM
WBC # BLD AUTO: 4.3 THOUSAND/UL (ref 4.31–10.16)

## 2019-07-16 PROCEDURE — 99232 SBSQ HOSP IP/OBS MODERATE 35: CPT | Performed by: INTERNAL MEDICINE

## 2019-07-16 PROCEDURE — 93010 ELECTROCARDIOGRAM REPORT: CPT | Performed by: INTERNAL MEDICINE

## 2019-07-16 PROCEDURE — 85027 COMPLETE CBC AUTOMATED: CPT | Performed by: INTERNAL MEDICINE

## 2019-07-16 PROCEDURE — 99232 SBSQ HOSP IP/OBS MODERATE 35: CPT | Performed by: PHYSICIAN ASSISTANT

## 2019-07-16 PROCEDURE — 80048 BASIC METABOLIC PNL TOTAL CA: CPT | Performed by: INTERNAL MEDICINE

## 2019-07-16 PROCEDURE — 99254 IP/OBS CNSLTJ NEW/EST MOD 60: CPT | Performed by: INTERNAL MEDICINE

## 2019-07-16 RX ORDER — OXYCODONE HYDROCHLORIDE 5 MG/1
2.5 TABLET ORAL EVERY 4 HOURS PRN
Status: DISCONTINUED | OUTPATIENT
Start: 2019-07-16 | End: 2019-07-19 | Stop reason: HOSPADM

## 2019-07-16 RX ORDER — SENNOSIDES 8.6 MG
2 TABLET ORAL
Status: DISCONTINUED | OUTPATIENT
Start: 2019-07-16 | End: 2019-07-16

## 2019-07-16 RX ORDER — SENNOSIDES 8.6 MG
2 TABLET ORAL
Status: DISCONTINUED | OUTPATIENT
Start: 2019-07-16 | End: 2019-07-17

## 2019-07-16 RX ORDER — OXYCODONE HYDROCHLORIDE 5 MG/1
5 TABLET ORAL EVERY 4 HOURS PRN
Status: DISCONTINUED | OUTPATIENT
Start: 2019-07-16 | End: 2019-07-19 | Stop reason: HOSPADM

## 2019-07-16 RX ORDER — HYDROMORPHONE HCL/PF 1 MG/ML
0.2 SYRINGE (ML) INJECTION
Status: DISCONTINUED | OUTPATIENT
Start: 2019-07-16 | End: 2019-07-19 | Stop reason: HOSPADM

## 2019-07-16 RX ORDER — ACETAMINOPHEN 325 MG/1
650 TABLET ORAL EVERY 6 HOURS SCHEDULED
Status: DISCONTINUED | OUTPATIENT
Start: 2019-07-16 | End: 2019-07-19

## 2019-07-16 RX ADMIN — LISINOPRIL 10 MG: 10 TABLET ORAL at 10:51

## 2019-07-16 RX ADMIN — OXYCODONE HYDROCHLORIDE 5 MG: 5 TABLET ORAL at 22:00

## 2019-07-16 RX ADMIN — ENOXAPARIN SODIUM 40 MG: 40 INJECTION SUBCUTANEOUS at 10:52

## 2019-07-16 RX ADMIN — ACYCLOVIR SODIUM 675 MG: 50 INJECTION, SOLUTION INTRAVENOUS at 17:50

## 2019-07-16 RX ADMIN — OXYCODONE HYDROCHLORIDE 5 MG: 5 TABLET ORAL at 17:47

## 2019-07-16 RX ADMIN — HYDROMORPHONE HYDROCHLORIDE 0.2 MG: 1 INJECTION, SOLUTION INTRAMUSCULAR; INTRAVENOUS; SUBCUTANEOUS at 19:45

## 2019-07-16 RX ADMIN — CITALOPRAM HYDROBROMIDE 20 MG: 20 TABLET ORAL at 10:51

## 2019-07-16 RX ADMIN — HYDROXYZINE HYDROCHLORIDE 25 MG: 25 TABLET ORAL at 22:01

## 2019-07-16 RX ADMIN — ACETAMINOPHEN 650 MG: 325 TABLET, FILM COATED ORAL at 17:47

## 2019-07-16 RX ADMIN — ACETAMINOPHEN 650 MG: 325 TABLET, FILM COATED ORAL at 13:23

## 2019-07-16 RX ADMIN — SENNOSIDES 17.2 MG: 8.6 TABLET, FILM COATED ORAL at 03:17

## 2019-07-16 RX ADMIN — PANTOPRAZOLE SODIUM 40 MG: 40 TABLET, DELAYED RELEASE ORAL at 06:06

## 2019-07-16 RX ADMIN — SENNOSIDES 17.2 MG: 8.6 TABLET, FILM COATED ORAL at 22:02

## 2019-07-16 RX ADMIN — CYCLOBENZAPRINE HYDROCHLORIDE 10 MG: 10 TABLET, FILM COATED ORAL at 19:45

## 2019-07-16 RX ADMIN — ACYCLOVIR SODIUM 675 MG: 50 INJECTION, SOLUTION INTRAVENOUS at 01:20

## 2019-07-16 RX ADMIN — OXYCODONE HYDROCHLORIDE 5 MG: 5 TABLET ORAL at 03:17

## 2019-07-16 RX ADMIN — ACYCLOVIR SODIUM 675 MG: 50 INJECTION, SOLUTION INTRAVENOUS at 10:53

## 2019-07-16 RX ADMIN — HYDROMORPHONE HYDROCHLORIDE 0.2 MG: 1 INJECTION, SOLUTION INTRAMUSCULAR; INTRAVENOUS; SUBCUTANEOUS at 10:59

## 2019-07-16 RX ADMIN — ACETAMINOPHEN 650 MG: 325 TABLET, FILM COATED ORAL at 10:51

## 2019-07-16 NOTE — UTILIZATION REVIEW
Initial Clinical Review    Admission: Date/Time/Statement: Inpatient 7/15/19 @ 1752     Orders Placed This Encounter   Procedures    Inpatient Admission     Standing Status:   Standing     Number of Occurrences:   1     Order Specific Question:   Admitting Physician     Answer:   Ma Schirmer [[id number]]     Order Specific Question:   Level of Care     Answer:   Level 2 Stepdown / HOT [14]     Order Specific Question:   Estimated length of stay     Answer:   More than 2 Midnights     Order Specific Question:   Certification     Answer:   I certify that inpatient services are medically necessary for this patient for a duration of greater than two midnights  See H&P and MD Progress Notes for additional information about the patient's course of treatment  ED Arrival Information     Expected Arrival Acuity Means of Arrival Escorted By Service Admission Type    - 7/15/2019 13:09 Urgent Walk-In Family Member Hospitalist Urgent    Arrival Complaint    -        Chief Complaint   Patient presents with    Headache     Pt  with severe headache x 1 week  Per pt  yesterday headache intensified  Noted possible shingles left abdomen  Pt  reports "lungs hurt when I walk "     Assessment/Plan: 64 y o  Female presents to ED from home admitted as Inpatient due to Viral Meningitis  Presented due to moderately severe headache she has been experiencing for 5 days  She acknowledges she took Tylenol that initially helped  However, over the weekend symptoms intensified with the addition of Neck stiffness and a rash under her Left breast similar to rash Left side of the posterior Thorax  In the ER, the MD exam reveals lethargy, distressed appearance with neck rigidity with decreased range of motion and Brudzinski & Kernig's sign  a CT of the head without contrast showed no acute intracranial abnormality and a CT of the head with contrast showed no acute pathology   A lumbar puncture was performed in the ER that showed evidence of viral etiology  Treat with iv pain control, IV Acyclovir follow up BMP, consult Infectious Disease  Droplet precautions  ED Triage Vitals [07/15/19 1327]   Temperature Pulse Respirations Blood Pressure SpO2   99 °F (37 2 °C) 77 18 140/62 98 %      Temp Source Heart Rate Source Patient Position - Orthostatic VS BP Location FiO2 (%)   Oral Monitor Lying Right arm --      Pain Score       Worst Possible Pain        Wt Readings from Last 1 Encounters:   07/15/19 88 kg (194 lb 0 1 oz)     Additional Vital Signs:   07/16/19 1040  98 3 °F (36 8 °C)  64  20  134/67  93  100 %       07/16/19 0700    55  14  109/60  79  96 %       07/16/19 0654  99 3 °F (37 4 °C)  55  14  109/60  79  98 %       07/16/19 0300  98 1 °F (36 7 °C)  67  20  105/60  76  98 %  None (Room air)  Lying   07/15/19 2256  98 4 °F (36 9 °C)  59  18  109/56  76  96 %  None (Room air)  Lying   07/15/19 1955  97 8 °F (36 6 °C)  54Abnormal   20  122/53  77  100 %  None (Room air)  Lying   07/15/19 1900    62               07/15/19 1645    50Abnormal                07/15/19 1614  97 6 °F (36 4 °C)                 07/15/19 1327  99 °F (37 2 °C)  77  18  140/62    98 %  None (Room air)  Lying      Weights (last 14 days)     Date/Time  Weight  Height   07/15/19 1955  88 kg (194 lb 0 1 oz)  5' 3" (1 6 m)   07/15/19 1327  88 5 kg (195 lb 1 7 oz)         Pertinent Labs/Diagnostic Test Results:   CTA ED chest PE study   No definite evidence for pulmonary embolism  Right lower lobe linear atelectasis is noted  CT head w contrast   No acute pathology  CT head without contrast   No acute intracranial abnormality     XR chest 2 views   No acute disease      · EKG:  Normal sinus rhythm with nonspecific T changes    Results from last 7 days   Lab Units 07/16/19  0352 07/15/19  1406   WBC Thousand/uL 4 30* 5 90   HEMOGLOBIN g/dL 11 8 13 5   HEMATOCRIT % 37 6 42 7   PLATELETS Thousands/uL 219 276   NEUTROS ABS Thousands/µL  --  3 91 Results from last 7 days   Lab Units 07/16/19  0352 07/15/19  1406   SODIUM mmol/L 137 136   POTASSIUM mmol/L 3 6 3 3*   CHLORIDE mmol/L 103 100   CO2 mmol/L 29 28   ANION GAP mmol/L 5 8   BUN mg/dL 6 8   CREATININE mg/dL 0 83 0 90   EGFR ml/min/1 73sq m 79 71   CALCIUM mg/dL 8 3 9 5     Results from last 7 days   Lab Units 07/15/19  1406   AST U/L 14   ALT U/L 17   ALK PHOS U/L 84   TOTAL PROTEIN g/dL 8 0   ALBUMIN g/dL 3 8   TOTAL BILIRUBIN mg/dL 0 80         Results from last 7 days   Lab Units 07/16/19  0352 07/15/19  1406   GLUCOSE RANDOM mg/dL 109 108       Results from last 7 days   Lab Units 07/15/19  1406   TROPONIN I ng/mL <0 02     Results from last 7 days   Lab Units 07/15/19  1406   D DIMER QUANT ng/ml (FEU) 1,356*     Results from last 7 days   Lab Units 07/15/19  1406   PROTIME seconds 12 7   INR  1 01   PTT seconds 29     Results from last 7 days   Lab Units 07/15/19  1438   CLARITY UA  Clear   COLOR UA  Light Yellow   SPEC GRAV UA  1 010   PH UA  7 5   GLUCOSE UA mg/dl Negative   KETONES UA mg/dl Negative   BLOOD UA  Negative   PROTEIN UA mg/dl Negative   NITRITE UA  Negative   BILIRUBIN UA  Negative   UROBILINOGEN UA E U /dl 0 2   LEUKOCYTES UA  Negative       Results from last 7 days   Lab Units 07/15/19  1645   GRAM STAIN RESULT  2+ Mononuclear Cells  No Polys  No No bacteria seen     Results from last 7 days   Lab Units 07/15/19  1645   TOTAL COUNTED  100     Results from last 7 days   Lab Units 07/15/19  1645   APPEARANCE CSF  Clear and Colorless   TUBE NUM CSF  4   WBC CSF /uL 179*   XANTHOCHROMIA  No   LYMPHS % (CSF) % 90   MONOCYTES % (CSF) % 10   GLUCOSE CSF mg/dL 52   PROTEIN CSF mg/dL 88*       ED Treatment:   Medication Administration from 07/15/2019 1309 to 07/15/2019 1951       Date/Time Order Dose Route Action     07/15/2019 1405 sodium chloride 0 9 % bolus 1,000 mL 1,000 mL Intravenous New Bag     07/15/2019 1439 ondansetron (ZOFRAN) injection 4 mg 4 mg Intravenous Given 07/15/2019 1439 HYDROmorphone (DILAUDID) injection 0 5 mg 0 5 mg Intravenous Given     07/15/2019 1542 lidocaine (PF) (XYLOCAINE-MPF) 2 % injection 10 mL 10 mL Infiltration Given     07/15/2019 1611 HYDROmorphone (DILAUDID) injection 0 5 mg 0 5 mg Intravenous Given     07/15/2019 1736 fentanyl citrate (PF) 100 MCG/2ML 100 mcg 100 mcg Intravenous Given     07/15/2019 1739 acyclovir (ZOVIRAX) 525 mg in sodium chloride 0 9 % 100 mL IVPB 525 mg Intravenous New Bag     07/15/2019 1918 ondansetron (ZOFRAN) injection 4 mg 4 mg Intravenous Given     07/15/2019 1919 HYDROmorphone (DILAUDID) injection 0 5 mg 0 5 mg Intravenous Given        Past Medical History:   Diagnosis Date    [facility] [address] [facility]     RIGHT    Cancer [facility] [address] [facility]     [facility]     Hypertension      Present on Admission:   Shingles rash   Essential hypertension   Viral meningitis    Admitting Diagnosis: Herpes zoster [B02 9]  Shingles rash [B02 9]  Headache [R51]  Headache [R51]  Age/Sex: 64 y o  female  Admission Orders:  Up OOB  Meningitis/encephalitis panel  scd  IP CONSULT TO INFECTIOUS DISEASES  Current Facility-Administered Medications:  acetaminophen 650 mg Oral Q6H [address]   acyclovir 10 mg/kg (Adjusted) Intravenous Q8H   citalopram 20 mg Oral Daily   cyclobenzaprine 10 mg Oral TID PRN   enoxaparin 40 mg Subcutaneous Daily   HYDROmorphone 0 2 mg Intravenous Q3H PRN   hydrOXYzine HCL 25 mg Oral HS   linaCLOtide 145 mcg Oral Daily   lisinopril 10 mg Oral Daily   oxyCODONE 2 5 mg Oral Q4H PRN   Or      oxyCODONE 5 mg Oral Q4H PRN   pantoprazole 40 mg Oral Early Morning   senna 2 tablet Oral HS       Network Utilization Review Department  Phone: [phone]; Fax [phone]  [email or web address]  ATTENTION: Please call with any questions or concerns to [phone]  and carefully listen to the prompts so that you are directed to the right person     Send all requests for admission clinical reviews, approved or denied determinations and any other requests to fax [phone]   All voicemails are confidential

## 2019-07-16 NOTE — ASSESSMENT & PLAN NOTE
· Suspected shingles rash on left upper quadrant of abdomen  · Meningitis encephalitis panel confirmed varicella zoster  · Continue IV acyclovir 10 milligrams/kilogram q 8 hours  · Daily BMP  · Continue pain management regimen of scheduled Tylenol and p r n   Oxycodone Rash visible on LUQ of abdomen, sensitive to touch

## 2019-07-16 NOTE — ASSESSMENT & PLAN NOTE
· Kernig sign negative  · Brudzinski signs positive  · Meningitis/Encephalitis panel confirmed Varicella Zoster as the etiologic agent for infection  · Patient is currently on droplet precaution  · Infectious Diseases has been consulted  · No fever  · Headache associated with limited ROM of head and neck  · Pain medications PRN  · Vesicular rash along T7 dermatome on left anterior and posterolateral aspect of the thorax-no weeping  · Continue treatment with IV Acyclovir 10mg/kg q8h and monitor patient   · BUN is 6 and creatinine is 0 83-we will continue to evaluate kidney function until acyclovir is stopped  · Patient was transferred from step-down unit to med surg unit

## 2019-07-16 NOTE — ASSESSMENT & PLAN NOTE
Kernig and Brudzinski signs positive  CT of head with and without contrast was negative for any malformations  Meningitis/Encephalitis panel confirmed Varicella Zoster as the etiologic agent for infection  Infectious Diseases has been consulted  No fever  Headache associated with limited ROM of head and neck  Pain medications PRN  Associated with Nausea - Zofran  Continue treatment with IV Acyclovir 10mg/kg q8h and monitor patient  Follow up with BMP tomorrow AM to assess kidney function

## 2019-07-16 NOTE — ASSESSMENT & PLAN NOTE
Rash visible on LUQ of abdomen, sensitive to touch  Unable to see reported rash on back due to patient's sensitivity to movements  Meningitis/Encephalitis panel confirmed Varicella Zoster as the etiologic agent for infection  Continue treatment with IV Acyclovir 10mg/kg q8h and monitor patient  Follow up with BMP tomorrow AM to assess kidney function  Acetominophen, Oxyocodone, and Dilaudid for mild/moderate, severe, and breakthrough pain respectively

## 2019-07-16 NOTE — H&P
H&P- Malgorzata Mccoy 1960, 64 y o  female MRN: [id number]    Unit/Bed#:  Encounter: [id number]    Primary Care Provider: Chris Streeter MD   Date and time admitted to hospital: 7/15/2019  1:18 PM        * Viral meningitis  Assessment & Plan  Kernig and Brudzinski signs positive  CT of head with and without contrast was negative for any malformations  Meningitis/Encephalitis panel confirmed Varicella Zoster as the etiologic agent for infection  Patient is currently on droplet precaution  Infectious Diseases has been consulted  No fever  Headache associated with limited ROM of head and neck  Pain medications PRN  Associated with Nausea - Zofran  Continue treatment with IV Acyclovir 10mg/kg q8h and monitor patient  Follow up with BMP tomorrow AM to assess kidney function    Essential hypertension  Assessment & Plan  /64  Currently managed with Lisinopril 10 mg  Continue to monitor BP and adjust dosage if necessary    VTE Prophylaxis: Enoxaparin (Lovenox)  / sequential compression device   Code Status: FULL  POLST: POLST form is not discussed and not completed at this time  Discussion with family:  Discussed with patient and family friend with the consent of the patient  Anticipated Length of Stay:  Patient will be admitted on an Inpatient basis with an anticipated length of stay of  greater than 2 midnights  Justification for Hospital Stay:  Acute viral meningitis    Total Time for Visit, including Counseling / Coordination of Care: 70 minutes  Greater than 50% of this total time spent on direct patient counseling and coordination of care  Chief Complaint:  Headache and neck stiffness    History of Present Illness:    Monika Mcclain is a 64 y o  female who presents with with past medical history significant for 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 who presents with an 8/10 headache that she has been experiencing for the past 5 days    Patient states that she took regular strength Tylenol, which initially helped  However, as the weekend progressed, the symptoms did not subside and she began to experience neck stiffness  Patient also stated that she had a rash located just under her left breast and a similar rash on the left side of her posterior thorax  In the ER, a CT of the head without contrast showed no acute intracranial abnormality and a CT of the head with contrast showed no acute pathology  A lumbar puncture was performed in the ER that showed evidence of viral etiology  Review of Systems:    Review of Systems   Constitutional: Positive for activity change, appetite change and chills  Negative for fever  Eyes: Positive for photophobia and pain  Pain "behind the eyes"   Respiratory: Negative for cough and shortness of breath  Gastrointestinal: Positive for nausea  Negative for vomiting  Neurological: Positive for headaches  Negative for dizziness, weakness and light-headedness  Psychiatric/Behavioral: Negative for confusion  All other systems reviewed and are negative  Past Medical and Surgical History:     Past Medical History:   Diagnosis Date    [facility] cancer ([facility] )     RIGHT    Cancer ([facility] )     Guillain Barré syndrome ([facility] )     Hypertension        Past Surgical History:   Procedure Laterality Date    MASTECTOMY, RADICAL Bilateral     [place] LAP,APPENDECTOMY N/A 4/11/2017    Procedure: APPENDECTOMY LAPAROSCOPIC;  Surgeon: Serene Curtis DO;  [facility];  Service: General    TOTAL KNEE ARTHROPLASTY Right        Meds/Allergies:    Prior to Admission medications    Medication Sig Start Date End Date Taking?  Authorizing Provider   citalopram (CELEXA) 20 mg tablet Take 1 tablet by mouth daily   Yes Historical Provider, MD   cyclobenzaprine (FLEXERIL) 10 mg tablet Take 1 tablet by mouth 3 (three) times a day as needed   Yes Historical Provider, MD   hydrOXYzine HCL (ATARAX) 25 mg tablet Take 1 tablet by mouth   Yes Historical Provider, MD   linaCLOtide [employer]) 145 MCG CAPS Take by mouth   Yes Historical Provider, MD   lisinopril (ZESTRIL) 10 mg tablet Take 1 tablet by mouth daily   Yes Historical Provider, MD   naproxen (NAPROSYN) 500 mg tablet Take 1 tablet by mouth every 12 (twelve) hours   Yes Historical Provider, MD   omeprazole (PriLOSEC) 20 mg delayed release capsule Take 20 mg/mL by mouth daily   Yes Historical Provider, MD   oxyCODONE-acetaminophen (PERCOCET) 5-325 mg per tablet Take 2 tablets by mouth every 4 (four) hours as needed for moderate pain for up to 20 doses Max Daily Amount: 12 tablets 4/11/17  Yes Josh Darling, DO     I have reviewed home medications with patient personally  Allergies: No Known Allergies    Social History:     Marital Status: /Civil Union   Patient Pre-hospital Living Situation:  Lives at home  Patient Pre-hospital Level of Mobility:  Full mobility  Patient Pre-hospital Diet Restrictions:  None  Substance Use History:   Social History     Substance and Sexual Activity   Alcohol Use No     Social History     Tobacco Use   Smoking Status Never Smoker   Smokeless Tobacco Never Used     Social History     Substance and Sexual Activity   Drug Use Never       Family History:    History reviewed  No pertinent family history  Physical Exam:     Vitals:   Blood Pressure: 122/53 (07/15/19 1955)  Pulse: (!) 54 (07/15/19 1955)  Temperature: 97 8 °F (36 6 °C) (07/15/19 1955)  Temp Source: Oral (07/15/19 1955)  Respirations: 20 (07/15/19 1955)  Height: 5' 3" (160 cm) (07/15/19 1955)  Weight - Scale: 88 kg (194 lb 0 1 oz) (07/15/19 1955)  SpO2: 100 % (07/15/19 1955)  Pulse re-evaluated at bedside: 60bpm  Physical Exam   Constitutional: She is oriented to person, place, and time  Vital signs are normal  She appears well-developed and well-nourished  She appears lethargic  She is cooperative  She appears distressed  HENT:   Head: Normocephalic and atraumatic  Eyes: Pupils are equal, round, and reactive to light   Conjunctivae and EOM are normal    Neck: Neck rigidity present  Decreased range of motion present  No edema present  Brudzinski's sign and Kernig's sign noted  Cardiovascular: Normal rate, regular rhythm, S1 normal, S2 normal, normal heart sounds and intact distal pulses  Exam reveals no gallop and no friction rub  No murmur heard  Pulmonary/Chest: Effort normal and breath sounds normal  No accessory muscle usage  No respiratory distress  She has no wheezes  She has no rales  Abdominal: Soft  Bowel sounds are normal  She exhibits no distension and no mass  There is tenderness  Tenderness only on palpation near the rash   Musculoskeletal: She exhibits no edema, tenderness or deformity  Lymphadenopathy:     She has no cervical adenopathy  Neurological: She is oriented to person, place, and time  She appears lethargic  No sensory deficit  Skin: Skin is warm, dry and intact  Rash noted  Psychiatric: She has a normal mood and affect  Her behavior is normal    Nursing note and vitals reviewed  Additional Data:     Lab Results: I have personally reviewed pertinent reports  and I have personally reviewed pertinent films in PACS    Results from last 7 days   Lab Units 07/15/19  1406   WBC Thousand/uL 5 90   HEMOGLOBIN g/dL 13 5   HEMATOCRIT % 42 7   PLATELETS Thousands/uL 276   NEUTROS PCT % 67   LYMPHS PCT % 24   MONOS PCT % 9   EOS PCT % 0     Results from last 7 days   Lab Units 07/15/19  1406   SODIUM mmol/L 136   POTASSIUM mmol/L 3 3*   CHLORIDE mmol/L 100   CO2 mmol/L 28   BUN mg/dL 8   CREATININE mg/dL 0 90   ANION GAP mmol/L 8   CALCIUM mg/dL 9 5   ALBUMIN g/dL 3 8   TOTAL BILIRUBIN mg/dL 0 80   ALK PHOS U/L 84   ALT U/L 17   AST U/L 14   GLUCOSE RANDOM mg/dL 108     Results from last 7 days   Lab Units 07/15/19  1406   INR  1 01                   Imaging: I have personally reviewed pertinent reports     and I have personally reviewed pertinent films in PACS    CTA ED chest PE study   Final Result by Lani Joellen Coronado MD (07/15 1542)      No definite evidence for pulmonary embolism  Right lower lobe linear atelectasis is noted  Workstation performed: [id number]         CT head w contrast   Final Result by Carol Carmen MD (07/15 1558)      No acute pathology  Workstation performed: [id number]         CT head without contrast   Final Result by Carol Carmen MD (07/15 1448)      No acute intracranial abnormality  Workstation performed: [id number]         XR chest 2 views   ED Interpretation by Srinivas De Santiago DO (07/15 1421)   No acute disease      Final Result by Siri Alvarez MD (07/15 1442)      No acute cardiopulmonary disease  Workstation performed: [id number]             EKG, Pathology, and Other Studies Reviewed on Admission:   · EKG:  Normal sinus rhythm with nonspecific T changes    Allscripts / Epic Records Reviewed: Yes     ** Please Note: This note has been constructed using a voice recognition system   **

## 2019-07-16 NOTE — PROGRESS NOTES
Progress Note - Malgorzata Mccoy 1960, 64 y o  female MRN: [id number]    Unit/Bed#:  Encounter: [id number]    Primary Care Provider: Analilia Mishra MD   Date and time admitted to hospital: 7/15/2019  1:18 PM        * Viral meningitis  Assessment & Plan  · Kernig sign negative  · Brudzinski signs positive  · Meningitis/Encephalitis panel confirmed Varicella Zoster as the etiologic agent for infection  · Patient is currently on droplet precaution  · Infectious Diseases has been consulted  · No fever  · Headache associated with limited ROM of head and neck  · Pain medications PRN  · Vesicular rash along T7 dermatome on left anterior and posterolateral aspect of the thorax-no weeping  · Continue treatment with IV Acyclovir 10mg/kg q8h and monitor patient   · BUN is 6 and creatinine is 0 83-we will continue to evaluate kidney function until acyclovir is stopped  · Patient was transferred from step-down unit to med surg unit    Essential hypertension  Assessment & Plan  /69  Currently managed with Lisinopril 10 mg  Continue to monitor blood pressure and adjust dosage if necessary        VTE Pharmacologic Prophylaxis:   Pharmacologic: Enoxaparin (Lovenox)  Mechanical VTE Prophylaxis in Place: Yes    Patient Centered Rounds: I have performed bedside rounds with nursing staff today  Discussions with Specialists or Other Care Team Provider:  Resident care team    Education and Discussions with Family / Patient:  Discussed with patient    Time Spent for Care: 30 minutes  More than 50% of total time spent on counseling and coordination of care as described above      Current Length of Stay: 1 day(s)    Current Patient Status: Inpatient   Certification Statement: The patient will continue to require additional inpatient hospital stay due to Continuing infection of acute viral meningitis    Discharge Plan:  Likely discharge in the next 2 days    Code Status: Level 1 - Full Code    Review of Systems Constitutional: Negative for chills, diaphoresis and fever  Eyes: Positive for photophobia  Negative for itching and visual disturbance  Respiratory: Negative for cough and shortness of breath  Skin: Positive for rash  Neurological: Positive for headaches  Negative for dizziness, syncope, speech difficulty, weakness, light-headedness and numbness  All other systems reviewed and are negative  Subjective: The patient continues to complain of a headache that improves with pain medication  She states she has difficulty with bright lights and prefers to have the room with no lights on  She continues to have pain on movement of the neck  When prompted about emotional or physical stressors patient shared that she works for and runs the [employer] [address] [place] [address] which has long hours and causes a lot of stress to her  Objective:     Vitals:   Temp (24hrs), Av 3 °F (36 8 °C), Min:97 6 °F (36 4 °C), Max:99 3 °F (37 4 °C)    Temp:  [97 6 °F (36 4 °C)-99 3 °F (37 4 °C)] 98 3 °F (36 8 °C)  HR:  [50-67] 64  Resp:  [14-20] 20  BP: (105-134)/(53-68) 134/67  SpO2:  [96 %-100 %] 100 %  Body mass index is 34 37 kg/m²  Input and Output Summary (last 24 hours): Intake/Output Summary (Last 24 hours) at 2019 1347  Last data filed at 2019 1331  Gross per 24 hour   Intake 2060 ml   Output 2775 ml   Net -715 ml       Physical Exam:     Physical Exam   Constitutional: She is oriented to person, place, and time  She appears well-developed and well-nourished  No distress  HENT:   Head: Normocephalic and atraumatic  Eyes: EOM are normal    Neck: Neck rigidity present  Decreased range of motion present  Brudzinski's sign noted  No Kernig's sign noted  Cardiovascular: Normal rate, regular rhythm, S1 normal, S2 normal, normal heart sounds and intact distal pulses  Exam reveals no gallop and no friction rub  No murmur heard    Pulmonary/Chest: Effort normal and breath sounds normal  No accessory muscle usage  No respiratory distress  She has no wheezes  She has no rales  Abdominal: Soft  Bowel sounds are normal  She exhibits no distension and no mass  There is no tenderness  Musculoskeletal: She exhibits no edema or deformity  Lymphadenopathy:     She has no cervical adenopathy  Neurological: She is alert and oriented to person, place, and time  Skin: Skin is warm, dry and intact  Rash noted  Rash is vesicular  Note that Site #1 should say 'vesicular rash'   Psychiatric: She has a normal mood and affect  Her behavior is normal        Additional Data:     Labs:    Results from last 7 days   Lab Units 07/16/19  0352 07/15/19  1406   WBC Thousand/uL 4 30* 5 90   HEMOGLOBIN g/dL 11 8 13 5   HEMATOCRIT % 37 6 42 7   PLATELETS Thousands/uL 219 276   NEUTROS PCT %  --  67   LYMPHS PCT %  --  24   MONOS PCT %  --  9   EOS PCT %  --  0     Results from last 7 days   Lab Units 07/16/19  0352 07/15/19  1406   SODIUM mmol/L 137 136   POTASSIUM mmol/L 3 6 3 3*   CHLORIDE mmol/L 103 100   CO2 mmol/L 29 28   BUN mg/dL 6 8   CREATININE mg/dL 0 83 0 90   ANION GAP mmol/L 5 8   CALCIUM mg/dL 8 3 9 5   ALBUMIN g/dL  --  3 8   TOTAL BILIRUBIN mg/dL  --  0 80   ALK PHOS U/L  --  84   ALT U/L  --  17   AST U/L  --  14   GLUCOSE RANDOM mg/dL 109 108     Results from last 7 days   Lab Units 07/15/19  1406   INR  1 01                       * I Have Reviewed All Lab Data Listed Above  * Additional Pertinent Lab Tests Reviewed:  eC Dolan Admission Reviewed    Imaging:    Imaging Reports Reviewed Today Include: None  Imaging Personally Reviewed by Myself Includes:  None    Recent Cultures (last 7 days):     Results from last 7 days   Lab Units 07/15/19  1645   GRAM STAIN RESULT  2+ Mononuclear Cells  No Polys  No No bacteria seen       Last 24 Hours Medication List:     Current Facility-Administered Medications:  acetaminophen 650 mg Oral Q6H [address] Suzanna Rodriguez PA-C    acyclovir 10 mg/kg (Adjusted) Intravenous Q8H Albert Familia, DO Last Rate: 675 mg (07/16/19 1053)   citalopram 20 mg Oral Daily [facility]    cyclobenzaprine 10 mg Oral TID PRN [facility], DO    enoxaparin 40 mg Subcutaneous Daily [facility], DO    HYDROmorphone 0 2 mg Intravenous Q3H PRN Gonzalez Campos PA-C    hydrOXYzine HCL 25 mg Oral HS [facility], DO    linaCLOtide 145 mcg Oral Daily [facility], DO    lisinopril 10 mg Oral Daily [facility], DO    oxyCODONE 2 5 mg Oral Q4H PRN Gonzalez Campos PA-C    Or        oxyCODONE 5 mg Oral Q4H PRN Suzanna Rodriguez PA-C    pantoprazole 40 mg Oral Early Morning [facility], DO    senna 2 tablet Oral HS Steve Mallory PA-C         Today, Patient Was Seen By: Christian Samuels DO    ** Please Note: Dictation voice to text software may have been used in the creation of this document   **

## 2019-07-16 NOTE — CONSULTS
Consultation - Infectious Disease   Malgorzata Mccoy 64 y o  female MRN: [id number]  Unit/Bed#:  Encounter: [id number]      IMPRESSION & RECOMMENDATIONS:     1  VZV viral meningitis  Patient presents with severe 10/10 headache, neck stiffness about 5 days after shingles eruption on left abdomen  CSF VZV +  Rec:  · Acyclovir 10 mg/adjusted kg for BMI >30/q 8 hours on board  · Continue IV acyclovir x 10 days and monitor closely for tolerance, improvement and daily kidney function  · Monitor Creatine daily  · Monitor headache and clinically  · Monitor temperature and hemodynamics  · Monitor CBC    2  Herpes zoster rash along left T7 dermatome  Patient presents with severe 10/10 headache, neck stiffness about 5 days after painful herpes zoster eruption developed on left abdomen  CSF VZV +  Rec:  · Antiviral as above  · Serial exams   · burows soaks as needed to dry blisters    3  History of Guillain-Braidwood  In 2007  Rec:  · Serial exam  · Additional supportive care per primary care team     4  Renal Fxn WNL  Rec:  · Monitor creatine closely on 10 day course of IV Acyclovir    Antibiotics:  Acyclovir IV day 1    Discussed in detail with patient, ICU team, and Dr Braden Holland  Thank you for this consultation  We will follow along with you  HISTORY OF PRESENT ILLNESS:  Reason for Consult: 1  Herpes zoster 2  Shingles rash 3  Viral meningitis    HPI: Evita Prince is a 64 y o  female with past medical history significant for right breast cancer, hypertension, and history of Guillain-[place]id[place] syndrome in 2007 that affected her right side primarily, who presented to the ER yesterday with a severe 8 to 10/10 headache that was not responding to any Tylenol around the clock  She states she started with a painful rash on her left  Abdomen last Monday or Tuesday  Then over the weekend her headache started to worsen with neck stiffness  In the ER, a CT scan of the head showed no acute intracranial abnormality    The patient underwent a lumbar puncture in the ER and her VZV was detected on her CSF viral panel  The patient was admitted to ICU on IV acyclovir and Infectious Disease is now being consulted regarding herpes zoster, shingles rash, and viral meningitis  REVIEW OF SYSTEMS:  The patient's pain in her abdomen it is better today, her headache is so still severe usually between 8 in 10 with Tylenol not helping  She has a poor appetite  She is sensitive to light  She has no significant nausea, vomiting, diarrhea, or any dysuria, and she has no upper respiratory symptoms  A complete 12 system-based review of systems is otherwise negative  PAST MEDICAL HISTORY:  Past Medical History:   Diagnosis Date    [facility] )     RIGHT    [facility]     [facility]     Hypertension      Past Surgical History:   Procedure Laterality Date    MASTECTOMY, RADICAL Bilateral     [place] LAP,APPENDECTOMY N/A 2017    Procedure: APPENDECTOMY LAPAROSCOPIC;  Surgeon: Theodora Guzman DO;  [facility];  Service: General    TOTAL KNEE ARTHROPLASTY Right        FAMILY HISTORY:  Non-contributory    SOCIAL HISTORY:  Social History     Substance and Sexual Activity   Alcohol Use No     Social History     Substance and Sexual Activity   Drug Use Never     Social History     Tobacco Use   Smoking Status Never Smoker   Smokeless Tobacco Never Used       ALLERGIES:  No Known Allergies    MEDICATIONS:  All current active medications have been reviewed      PHYSICAL EXAM:  Vitals:  Temp:  [97 6 °F (36 4 °C)-99 3 °F (37 4 °C)] 98 5 °F (36 9 °C)  HR:  [50-67] 60  Resp:  [14-20] 20  BP: (105-134)/(53-68) 119/64  SpO2:  [96 %-100 %] 96 %  Temp (24hrs), Av 3 °F (36 8 °C), Min:97 6 °F (36 4 °C), Max:99 3 °F (37 4 °C)  Current: Temperature: 98 5 °F (36 9 °C)     Physical Exam:  General:  77-year-old female resting in bed in ICU with shades fully drawn and lights off, well-nourished, well-developed, in no acute distress  Eyes:  PERRL, EOMI, Conjunctive clear with no hemorrhages or effusions  Oropharynx:  No ulcers, no lesions  Neck:  + posterior discomfort and rigidity  No lymphadenopathy  Lungs:  Clear to auscultation bilaterally, no accessory muscle use  Cardiac:  Regular rate and rhythm, no murmurs  Abdomen:  Soft, mild tenderness over vesicular eruption of left abdominal wall that radiates to left thoracic back, non-distended  Extremities:  No peripheral cyanosis, clubbing, or edema, IV site nontender  Skin:  vesicular eruption of left abdominal wall with blisters intact without active weeping and pinkness radiating to left thoracic back  Neurological:  Moves all four extremities spontaneously, sensation grossly intact    LABS, IMAGING, & OTHER STUDIES:  Lab Results:  I have personally reviewed pertinent labs  Results from last 7 days   Lab Units 07/16/19  0352 07/15/19  1406   POTASSIUM mmol/L 3 6 3 3*   CHLORIDE mmol/L 103 100   CO2 mmol/L 29 28   BUN mg/dL 6 8   CREATININE mg/dL 0 83 0 90   EGFR ml/min/1 73sq m 79 71   CALCIUM mg/dL 8 3 9 5   AST U/L  --  14   ALT U/L  --  17   ALK PHOS U/L  --  84     Results from last 7 days   Lab Units 07/16/19  0352 07/15/19  1406   WBC Thousand/uL 4 30* 5 90   HEMOGLOBIN g/dL 11 8 13 5   PLATELETS Thousands/uL 219 276     Results from last 7 days   Lab Units 07/15/19  1645   GRAM STAIN RESULT  2+ Mononuclear Cells  No Polys  No No bacteria seen           Imaging Studies:   I have personally reviewed pertinent imaging study reports and images in PACS  7/15/19 CTE PE: No definite evidence for pulmonary embolism   Right lower lobe linear atelectasis is noted  7/15/19 CT Brain x 2: no acute pathology    EKG, Pathology, and Other Studies:   I have personally reviewed pertinent reports

## 2019-07-16 NOTE — PROGRESS NOTES
Progress Note - Malgorzata Mccoy 64 y o  female MRN: [id number]    Unit/Bed#:  Encounter: [id number]      Assessment:  Principal Problem:    Viral meningitis  Active Problems:    Essential hypertension    Shingles rash      Plan:  * Viral meningitis  Assessment & Plan  · Kernig and Brudzinski signs positive  on presentation  · CT head with and without contrast negative for acute pathology  · Lumbar puncture positive for varicella zoster  · ID input appreciated  · Continue acyclovir  · Patient states headache has reduced to 7/10 from 10/10    Essential hypertension  Assessment & Plan  Blood pressure well controlled on lisinopril  Continue to monitor    Shingles rash  Assessment & Plan  · Suspected shingles rash on left upper quadrant of abdomen  · Meningitis encephalitis panel confirmed varicella zoster  · Continue IV acyclovir 10 milligrams/kilogram q 8 hours  · Daily BMP  · Continue pain management regimen of scheduled Tylenol and p r n  Oxycodone Rash visible on LUQ of abdomen, sensitive to touch          Subjective:   Patient states headache has improved to 7/10  Continues with light sensitivity  No other complaints  States she is hungry today  Objective:   Vitals  Vitals:    07/15/19 2256 07/16/19 0300 07/16/19 0654 07/16/19 0700   BP: 109/56 105/60 109/60 109/60   BP Location: Left arm Left arm     Pulse: 59 67 55 55   Resp: 18 20 14 14   Temp: 98 4 °F (36 9 °C) 98 1 °F (36 7 °C) 99 3 °F (37 4 °C)    TempSrc: Oral Oral Oral    SpO2: 96% 98% 98% 96%   Weight:       Height:           Intake and Outputs:  I/O       07/14 0701 - 07/15 0700 07/15 0701 - 07/16 0700 07/16 0701 - 07/17 0700    P  O   360     I V  (mL/kg)  600 (6 8)     IV Piggyback  1100     Total Intake(mL/kg)  2060 (23 4)     Urine (mL/kg/hr)  1250     Total Output  1250     Net  +810                  Physical Exam   Constitutional: She is oriented to person, place, and time  She appears well-developed and well-nourished  No distress  HENT:   Head: Normocephalic and atraumatic  Eyes: Pupils are equal, round, and reactive to light  Conjunctivae are normal  No scleral icterus  Neck: Normal range of motion  JVD present  Cardiovascular: Normal rate, regular rhythm and normal heart sounds  Pulmonary/Chest: Effort normal and breath sounds normal    Abdominal: Soft  Bowel sounds are normal  She exhibits no distension  Musculoskeletal: Normal range of motion  She exhibits no edema  Neurological: She is alert and oriented to person, place, and time  No cranial nerve deficit  She exhibits normal muscle tone  Coordination normal    Skin: Skin is warm and dry  Capillary refill takes less than 2 seconds  Rash noted  She is not diaphoretic  Psychiatric: She has a normal mood and affect   Her behavior is normal        Invasive Devices     Peripheral Intravenous Line            Peripheral IV 07/15/19 Left Antecubital less than 1 day                Medications:   Scheduled Meds:  Current Facility-Administered Medications:  acetaminophen 650 mg Oral Q6H [facility] Suzanna Rodriguez PA-C    acyclovir 10 mg/kg (Adjusted) Intravenous Q8H Albert DO Familia Last Rate: 675 mg (07/16/19 0120)   citalopram 20 mg Oral Daily [facility] Familia, DO    cyclobenzaprine 10 mg Oral TID PRN [facility] Familia, DO    enoxaparin 40 mg Subcutaneous Daily Albert Familia, DO    HYDROmorphone 0 2 mg Intravenous Q3H PRN Randal Wilcox PA-C    hydrOXYzine HCL 25 mg Oral HS [facility] Familia, DO    linaCLOtide 145 mcg Oral Daily [facility] Familia, DO    lisinopril 10 mg Oral Daily Albert Familia, DO    oxyCODONE 2 5 mg Oral Q4H PRN Randal Wilcox PA-C    Or        oxyCODONE 5 mg Oral Q4H PRN Randal JOHN Wilcox    pantoprazole 40 mg Oral Early Morning Albert Barbosa, DO    senna 2 tablet Oral HS Lyubov Giordano PA-C    sodium chloride 75 mL/hr Intravenous Continuous Christie Baldwin DO Last Rate: 75 mL/hr (07/15/19 2000)     Continuous Infusions:  sodium chloride 75 mL/hr Last Rate: 75 mL/hr (07/15/19 2000)     PRN Meds:    cyclobenzaprine 10 mg TID PRN   HYDROmorphone 0 2 mg Q3H PRN   oxyCODONE 2 5 mg Q4H PRN   Or     oxyCODONE 5 mg Q4H PRN       LABS:  Results from last 7 days   Lab Units 07/16/19  0352 07/15/19  1406   WBC Thousand/uL 4 30* 5 90   HEMOGLOBIN g/dL 11 8 13 5   HEMATOCRIT % 37 6 42 7   PLATELETS Thousands/uL 219 276   NEUTROS PCT %  --  67   MONOS PCT %  --  9    Results from last 7 days   Lab Units 07/16/19  0352 07/15/19  1406   POTASSIUM mmol/L 3 6 3 3*   CHLORIDE mmol/L 103 100   CO2 mmol/L 29 28   BUN mg/dL 6 8   CREATININE mg/dL 0 83 0 90   CALCIUM mg/dL 8 3 9 5   ALK PHOS U/L  --  84   ALT U/L  --  17   AST U/L  --  14         Lab, Imaging and other studies: I have personally reviewed pertinent reports  and I have personally reviewed pertinent films in PACS  VTE Pharmacologic Prophylaxis: Sequential compression device (Venodyne)  and Enoxaparin (Lovenox)  VTE Mechanical Prophylaxis: sequential compression device    Patient Centered Rounds: I have performed bedside rounds with nursing staff today    Current Length of Stay: LOS day 1  Current Patient Status:  SD 2  Discharge Plan: Not stable for discharge

## 2019-07-16 NOTE — ASSESSMENT & PLAN NOTE
· Kernig and Brudzinski signs positive  on presentation  · CT head with and without contrast negative for acute pathology  · Lumbar puncture positive for varicella zoster  · ID input appreciated  · Continue acyclovir  · Patient states headache has reduced to 7/10 from 10/10

## 2019-07-17 LAB
ALBUMIN SERPL BCP-MCNC: 3.1 G/DL (ref 3.5–5)
ALP SERPL-CCNC: 79 U/L (ref 46–116)
ALT SERPL W P-5'-P-CCNC: 35 U/L (ref 12–78)
ANION GAP SERPL CALCULATED.3IONS-SCNC: 7 MMOL/L (ref 4–13)
AST SERPL W P-5'-P-CCNC: 33 U/L (ref 5–45)
BASOPHILS # BLD AUTO: 0.01 THOUSANDS/ΜL (ref 0–0.1)
BASOPHILS NFR BLD AUTO: 0 % (ref 0–1)
BILIRUB SERPL-MCNC: 0.4 MG/DL (ref 0.2–1)
BUN SERPL-MCNC: 6 MG/DL (ref 5–25)
CALCIUM SERPL-MCNC: 8.3 MG/DL (ref 8.3–10.1)
CHLORIDE SERPL-SCNC: 104 MMOL/L (ref 100–108)
CO2 SERPL-SCNC: 30 MMOL/L (ref 21–32)
CREAT SERPL-MCNC: 0.84 MG/DL (ref 0.6–1.3)
EOSINOPHIL # BLD AUTO: 0.05 THOUSAND/ΜL (ref 0–0.61)
EOSINOPHIL NFR BLD AUTO: 1 % (ref 0–6)
ERYTHROCYTE [DISTWIDTH] IN BLOOD BY AUTOMATED COUNT: 14.6 % (ref 11.6–15.1)
GFR SERPL CREATININE-BSD FRML MDRD: 77 ML/MIN/1.73SQ M
GLUCOSE SERPL-MCNC: 110 MG/DL (ref 65–140)
HAV IGM SER QL: NORMAL
HBV CORE IGM SER QL: NORMAL
HBV SURFACE AG SER QL: NORMAL
HCT VFR BLD AUTO: 39.7 % (ref 34.8–46.1)
HCV AB SER QL: NORMAL
HGB BLD-MCNC: 12.4 G/DL (ref 11.5–15.4)
HIV 1+2 AB+HIV1 P24 AG SERPL QL IA: NORMAL
HIV1 P24 AG SER QL: NORMAL
IMM GRANULOCYTES # BLD AUTO: 0.01 THOUSAND/UL (ref 0–0.2)
IMM GRANULOCYTES NFR BLD AUTO: 0 % (ref 0–2)
LYMPHOCYTES # BLD AUTO: 0.76 THOUSANDS/ΜL (ref 0.6–4.47)
LYMPHOCYTES NFR BLD AUTO: 20 % (ref 14–44)
MAGNESIUM SERPL-MCNC: 2 MG/DL (ref 1.6–2.6)
MCH RBC QN AUTO: 26.7 PG (ref 26.8–34.3)
MCHC RBC AUTO-ENTMCNC: 31.2 G/DL (ref 31.4–37.4)
MCV RBC AUTO: 86 FL (ref 82–98)
MONOCYTES # BLD AUTO: 0.35 THOUSAND/ΜL (ref 0.17–1.22)
MONOCYTES NFR BLD AUTO: 9 % (ref 4–12)
NEUTROPHILS # BLD AUTO: 2.55 THOUSANDS/ΜL (ref 1.85–7.62)
NEUTS SEG NFR BLD AUTO: 70 % (ref 43–75)
NRBC BLD AUTO-RTO: 0 /100 WBCS
PLATELET # BLD AUTO: 228 THOUSANDS/UL (ref 149–390)
PMV BLD AUTO: 9.3 FL (ref 8.9–12.7)
POTASSIUM SERPL-SCNC: 3.9 MMOL/L (ref 3.5–5.3)
PROT SERPL-MCNC: 6.7 G/DL (ref 6.4–8.2)
RBC # BLD AUTO: 4.64 MILLION/UL (ref 3.81–5.12)
SODIUM SERPL-SCNC: 141 MMOL/L (ref 136–145)
WBC # BLD AUTO: 3.73 THOUSAND/UL (ref 4.31–10.16)

## 2019-07-17 PROCEDURE — 80053 COMPREHEN METABOLIC PANEL: CPT | Performed by: PHYSICIAN ASSISTANT

## 2019-07-17 PROCEDURE — 99232 SBSQ HOSP IP/OBS MODERATE 35: CPT | Performed by: INTERNAL MEDICINE

## 2019-07-17 PROCEDURE — 83735 ASSAY OF MAGNESIUM: CPT | Performed by: PHYSICIAN ASSISTANT

## 2019-07-17 PROCEDURE — G8979 MOBILITY GOAL STATUS: HCPCS

## 2019-07-17 PROCEDURE — G8978 MOBILITY CURRENT STATUS: HCPCS

## 2019-07-17 PROCEDURE — 86361 T CELL ABSOLUTE COUNT: CPT | Performed by: PHYSICIAN ASSISTANT

## 2019-07-17 PROCEDURE — 85025 COMPLETE CBC W/AUTO DIFF WBC: CPT | Performed by: PHYSICIAN ASSISTANT

## 2019-07-17 PROCEDURE — 97163 PT EVAL HIGH COMPLEX 45 MIN: CPT

## 2019-07-17 PROCEDURE — 87806 HIV AG W/HIV1&2 ANTB W/OPTIC: CPT | Performed by: PHYSICIAN ASSISTANT

## 2019-07-17 PROCEDURE — 99233 SBSQ HOSP IP/OBS HIGH 50: CPT | Performed by: INTERNAL MEDICINE

## 2019-07-17 PROCEDURE — 80074 ACUTE HEPATITIS PANEL: CPT | Performed by: PHYSICIAN ASSISTANT

## 2019-07-17 RX ORDER — ONDANSETRON 2 MG/ML
4 INJECTION INTRAMUSCULAR; INTRAVENOUS EVERY 6 HOURS PRN
Status: DISCONTINUED | OUTPATIENT
Start: 2019-07-17 | End: 2019-07-19 | Stop reason: HOSPADM

## 2019-07-17 RX ORDER — POLYVINYL ALCOHOL 14 MG/ML
1 SOLUTION/ DROPS OPHTHALMIC
Status: DISCONTINUED | OUTPATIENT
Start: 2019-07-17 | End: 2019-07-19 | Stop reason: HOSPADM

## 2019-07-17 RX ORDER — SENNOSIDES 8.6 MG
2 TABLET ORAL 2 TIMES DAILY
Status: DISCONTINUED | OUTPATIENT
Start: 2019-07-17 | End: 2019-07-19 | Stop reason: HOSPADM

## 2019-07-17 RX ORDER — ONDANSETRON 2 MG/ML
INJECTION INTRAMUSCULAR; INTRAVENOUS
Status: DISPENSED
Start: 2019-07-17 | End: 2019-07-18

## 2019-07-17 RX ORDER — BISACODYL 10 MG
10 SUPPOSITORY, RECTAL RECTAL DAILY PRN
Status: DISCONTINUED | OUTPATIENT
Start: 2019-07-17 | End: 2019-07-19 | Stop reason: HOSPADM

## 2019-07-17 RX ADMIN — ACETAMINOPHEN 650 MG: 325 TABLET, FILM COATED ORAL at 05:51

## 2019-07-17 RX ADMIN — ACYCLOVIR SODIUM 675 MG: 50 INJECTION, SOLUTION INTRAVENOUS at 01:10

## 2019-07-17 RX ADMIN — SENNOSIDES 17.2 MG: 8.6 TABLET, FILM COATED ORAL at 13:43

## 2019-07-17 RX ADMIN — ACETAMINOPHEN 650 MG: 325 TABLET, FILM COATED ORAL at 17:17

## 2019-07-17 RX ADMIN — ACETAMINOPHEN 650 MG: 325 TABLET, FILM COATED ORAL at 11:43

## 2019-07-17 RX ADMIN — OXYCODONE HYDROCHLORIDE 2.5 MG: 5 TABLET ORAL at 09:12

## 2019-07-17 RX ADMIN — PANTOPRAZOLE SODIUM 40 MG: 40 TABLET, DELAYED RELEASE ORAL at 05:51

## 2019-07-17 RX ADMIN — CITALOPRAM HYDROBROMIDE 20 MG: 20 TABLET ORAL at 09:12

## 2019-07-17 RX ADMIN — ACYCLOVIR SODIUM 675 MG: 50 INJECTION, SOLUTION INTRAVENOUS at 09:14

## 2019-07-17 RX ADMIN — ACETAMINOPHEN 650 MG: 325 TABLET, FILM COATED ORAL at 01:00

## 2019-07-17 RX ADMIN — SENNOSIDES 17.2 MG: 8.6 TABLET, FILM COATED ORAL at 22:12

## 2019-07-17 RX ADMIN — OXYCODONE HYDROCHLORIDE 5 MG: 5 TABLET ORAL at 04:00

## 2019-07-17 RX ADMIN — LISINOPRIL 10 MG: 10 TABLET ORAL at 09:12

## 2019-07-17 RX ADMIN — ENOXAPARIN SODIUM 40 MG: 40 INJECTION SUBCUTANEOUS at 09:12

## 2019-07-17 RX ADMIN — HYDROXYZINE HYDROCHLORIDE 25 MG: 25 TABLET ORAL at 22:12

## 2019-07-17 RX ADMIN — ACETAMINOPHEN 650 MG: 325 TABLET, FILM COATED ORAL at 23:28

## 2019-07-17 RX ADMIN — ACYCLOVIR SODIUM 675 MG: 50 INJECTION, SOLUTION INTRAVENOUS at 17:17

## 2019-07-17 RX ADMIN — ONDANSETRON 4 MG: 2 INJECTION INTRAMUSCULAR; INTRAVENOUS at 17:25

## 2019-07-17 RX ADMIN — CYCLOBENZAPRINE HYDROCHLORIDE 10 MG: 10 TABLET, FILM COATED ORAL at 23:28

## 2019-07-17 NOTE — PROGRESS NOTES
Progress Note - Malgorzata Mccoy 1960, 64 y o  female MRN: [id number]    Unit/Bed#:  Encounter: [id number]    Primary Care Provider: Lyla Coronado MD   Date and time admitted to hospital: 7/15/2019  1:18 PM        * Herpes zoster with meningitis  Assessment & Plan  · Kernig sign negative  · Brudzinski signs positive  · Patient is currently on contact precaution  · Infectious Diseases recommends 2 week protocol of acyclovir IV  · Headache associated with limited ROM of head and neck -improving  · Pain medications PRN  · Vesicular rash along T7 dermatome on left anterior and posterolateral aspect of the thorax-no weeping, slight improvement  · A bandage was placed on top of the rash because the patient complained of itching  · Continue treatment with IV Acyclovir 10mg/kg q8h and monitor patient  · BUN is 6 and creatinine is 0 84-we will continue to evaluate kidney function until acyclovir is stopped  · White blood cell was decreased to 3 73 from 4 3 yesterday-id considering testing for HIV, hepatitis, CD4 count  · Ophthalmology has been consulted as per recommendations from Infectious Diseases  · Occupational therapy and Physical therapy were consulted  · Discussed with case management the possibility of discharge tomorrow and outpatient treatment    Essential hypertension  Assessment & Plan  /64  Currently managed with Lisinopril 10 mg  Continue to monitor blood pressure and adjust dosage if necessary    VTE Pharmacologic Prophylaxis:   Pharmacologic: Enoxaparin (Lovenox)  Mechanical VTE Prophylaxis in Place: Yes    Patient Centered Rounds: I have performed bedside rounds with nursing staff today  Discussions with Specialists or Other Care Team Provider:  Resident Jeovany Crews    Education and Discussions with Family / Patient:  Discussed with patient    Time Spent for Care: 30 minutes  More than 50% of total time spent on counseling and coordination of care as described above      Current Length of Stay: 2 day(s)    Current Patient Status: Inpatient   Certification Statement: The patient will continue to require additional inpatient hospital stay due to IV acyclovir therapy as per recommendations of Infectious Diseases    Discharge Plan:  Discussing with  regarding placement in rehab for IV management    Code Status: Level 1 - Full Code      Subjective:   Patient states his symptoms are improving  Id had been consulted and stated that the patient needs to be on 2 more weeks acyclovir IV  As per the ID consult it is not feasible to receive the treatment at home, so the patient would prefer to receive the treatment of the hospital or at an outpatient facility  Review of Systems   Constitutional: Negative for fatigue and fever  Eyes: Positive for photophobia  Negative for pain and visual disturbance  Musculoskeletal: Positive for neck pain and neck stiffness  Negative for back pain  Skin: Positive for rash  All other systems reviewed and are negative  Objective:     Vitals:   Temp (24hrs), Av 2 °F (36 8 °C), Min:98 °F (36 7 °C), Max:98 3 °F (36 8 °C)    Temp:  [98 °F (36 7 °C)-98 3 °F (36 8 °C)] 98 3 °F (36 8 °C)  HR:  [55-70] 62  Resp:  [18-22] 18  BP: (108-127)/(56-62) 115/58  SpO2:  [95 %-100 %] 97 %  Body mass index is 34 37 kg/m²  Input and Output Summary (last 24 hours): Intake/Output Summary (Last 24 hours) at 2019 1624  Last data filed at 2019 1301  Gross per 24 hour   Intake 1880 ml   Output 825 ml   Net 1055 ml       Physical Exam:     Physical Exam   Constitutional: She is oriented to person, place, and time  She appears well-developed and well-nourished  No distress  HENT:   Head: Normocephalic and atraumatic  Eyes: Pupils are equal, round, and reactive to light  Conjunctivae are normal    Neck: Neck rigidity present  Decreased range of motion present     Cardiovascular: Normal rate, regular rhythm, S1 normal, S2 normal, normal heart sounds and intact distal pulses  Exam reveals no gallop and no friction rub  No murmur heard  Pulmonary/Chest: Effort normal and breath sounds normal  No accessory muscle usage  No respiratory distress  She has no wheezes  She has no rales  Abdominal: Soft  Bowel sounds are normal  She exhibits no distension and no mass  There is no tenderness  Musculoskeletal: She exhibits no edema  Lymphadenopathy:     She has no cervical adenopathy  Neurological: She is alert and oriented to person, place, and time  Skin: Skin is warm, dry and intact  Psychiatric: She has a normal mood and affect  Her behavior is normal    Nursing note and vitals reviewed  Additional Data:     Labs:    Results from last 7 days   Lab Units 07/17/19  0353   WBC Thousand/uL 3 73*   HEMOGLOBIN g/dL 12 4   HEMATOCRIT % 39 7   PLATELETS Thousands/uL 228   NEUTROS PCT % 70   LYMPHS PCT % 20   MONOS PCT % 9   EOS PCT % 1     Results from last 7 days   Lab Units 07/17/19  0353   SODIUM mmol/L 141   POTASSIUM mmol/L 3 9   CHLORIDE mmol/L 104   CO2 mmol/L 30   BUN mg/dL 6   CREATININE mg/dL 0 84   ANION GAP mmol/L 7   CALCIUM mg/dL 8 3   ALBUMIN g/dL 3 1*   TOTAL BILIRUBIN mg/dL 0 40   ALK PHOS U/L 79   ALT U/L 35   AST U/L 33   GLUCOSE RANDOM mg/dL 110     Results from last 7 days   Lab Units 07/15/19  1406   INR  1 01                       * I Have Reviewed All Lab Data Listed Above  * Additional Pertinent Lab Tests Reviewed: Ce 66 Admission Reviewed    Imaging:    Imaging Reports Reviewed Today Include:  None  Imaging Personally Reviewed by Myself Includes:  None    Recent Cultures (last 7 days):     Results from last 7 days   Lab Units 07/15/19  1645 07/15/19  1414 07/15/19  1406   BLOOD CULTURE   --  No Growth at 24 hrs  No Growth at 24 hrs     GRAM STAIN RESULT  2+ Mononuclear Cells  No Polys  No No bacteria seen  --   --        Last 24 Hours Medication List:     Current Facility-Administered Medications:  acetaminophen 650 mg Oral Q6H [facility] Suzanna Rodriguez PA-C    acyclovir 10 mg/kg (Adjusted) Intravenous Q8H Albert Barbosa DO Last Rate: Stopped (07/17/19 1014)   bisacodyl 10 mg Rectal Daily PRN JEREMY Key    citalopram 20 mg Oral Daily Albert Familia,     cyclobenzaprine 10 mg Oral TID PRN Albert Familia, DO    enoxaparin 40 mg Subcutaneous Daily [facility] Familia, DO    HYDROmorphone 0 2 mg Intravenous Q3H PRN Gonzalez Campos PA-C    hydrOXYzine HCL 25 mg Oral HS [facility], DO    linaCLOtide 145 mcg Oral Daily [facility], DO    lisinopril 10 mg Oral Daily [facility] Familia, DO    oxyCODONE 2 5 mg Oral Q4H PRN Gonzalez Campos PA-C    Or        oxyCODONE 5 mg Oral Q4H PRN Suzanna Rodriguez PA-C    pantoprazole 40 mg Oral Early Morning Albert Familia,     senna 2 tablet Oral BID JEREMY Key         Today, Patient Was Seen By: Christian Samuels DO    ** Please Note: Dictation voice to text software may have been used in the creation of this document   **

## 2019-07-17 NOTE — ASSESSMENT & PLAN NOTE
· Kernig sign negative  · Brudzinski signs positive  · Patient is currently on contact precaution  · Infectious Diseases recommends 2 week protocol of acyclovir IV  · Headache associated with limited ROM of head and neck -improving  · Pain medications PRN  · Vesicular rash along T7 dermatome on left anterior and posterolateral aspect of the thorax-no weeping, slight improvement  · A bandage was placed on top of the rash because the patient complained of itching  · Continue treatment with IV Acyclovir 10mg/kg q8h and monitor patient  · BUN is 6 and creatinine is 0 84-we will continue to evaluate kidney function until acyclovir is stopped  · White blood cell was decreased to 3 73 from 4 3 yesterday-id considering testing for HIV, hepatitis, CD4 count  · Ophthalmology has been consulted as per recommendations from Infectious Diseases  · Occupational therapy and Physical therapy were consulted  · Discussed with case management the possibility of discharge tomorrow and outpatient treatment

## 2019-07-17 NOTE — PLAN OF CARE
Problem: Potential for Falls  Goal: Patient will remain free of falls  Description  INTERVENTIONS:  - Assess patient frequently for physical needs  -  Identify cognitive and physical deficits and behaviors that affect risk of falls    -  [place] fall precautions as indicated by assessment   - Educate patient/family on patient safety including physical limitations  - Instruct patient to call for assistance with activity based on assessment  - Modify environment to reduce risk of injury  - Consider OT/PT consult to assist with strengthening/mobility  Outcome: Progressing

## 2019-07-17 NOTE — PLAN OF CARE
Problem: Potential for Falls  Goal: Patient will remain free of falls  Description  INTERVENTIONS:  - Assess patient frequently for physical needs  -  Identify cognitive and physical deficits and behaviors that affect risk of falls    -  [place] fall precautions as indicated by assessment   - Educate patient/family on patient safety including physical limitations  - Instruct patient to call for assistance with activity based on assessment  - Modify environment to reduce risk of injury  - Consider OT/PT consult to assist with strengthening/mobility  Outcome: Not Progressing

## 2019-07-17 NOTE — PROGRESS NOTES
Progress Note - Infectious Disease   Malgorzata Mccoy 64 y o  female MRN: [id number]  Unit/Bed#:  Encounter: [id number]      Impression/Plan:  1  Herpes zoster meningitis  Patient presents with severe 10/10 headache, neck stiffness about 5 days after shingles eruption on left abdomen  CSF VZV +  Rec:  · Acyclovir 10 mg/adjusted kg for BMI >30/q 8 hours on board  · Continue IV acyclovir x 14 days and monitor closely for tolerance, improvement and daily kidney function  Anticipated end date is 19  · Patient is cleared for PICC placement when feasible for protracted IV acyclovir course  · Monitor Creatine daily  · Monitor headache and clinically  · Monitor temperature and hemodynamics  · Monitor CBC  · Ophthalmology consulted      2   Herpes zoster rash left T7 dermatome  Patient presents with severe 10/10 headache, neck stiffness about 5 days after painful herpes zoster eruption developed on left abdomen  CSF VZV +  Rec:  · Antiviral as above  · Serial exams   · burows soaks as needed to dry blisters     3  History of Guillain-Mulliken  In   Rec:  · Serial exam  · Additional supportive care per primary care team      4  Renal Fxn WNL  Rec:  · Monitor creatine closely on 10 day course of IV Acyclovir    5  Mild leukopenia  Consistent with viral infection  Rec:  Monitor CBC     Antiinfective:  Acyclovir IV day 2     Discussed in detail with patient and medical team  All of many questions answered and reassurance given  Subjective:  Patient has no fever, chills, sweats; no nausea, vomiting, diarrhea; no cough, shortness of breath; Headache is down to a 6 out of 10 at rest and back to 8+ with any movement  Eyes are tired and some blurred vision  No dysuria  No new symptoms      Objective:  Vitals:  Temp:  [98 °F (36 7 °C)-98 5 °F (36 9 °C)] 98 °F (36 7 °C)  HR:  [55-70] 55  Resp:  [18-22] 18  BP: (108-134)/(56-67) 114/62  SpO2:  [95 %-100 %] 97 %  Temp (24hrs), Av 2 °F (36 8 °C), Min:98 °F (36 7 °C), Max:98 5 °F (36 9 °C)  Current: Temperature: 98 °F (36 7 °C)    Physical Exam:   General Appearance:  Alert, interactive, nontoxic, no acute distress  In bed with shades drawn in dark room ICU step down   Eyes: Blinks frequently,EOMIs   Neck: Increased range of motion and last posterior stiffness   Throat: Oropharynx moist without lesions  Lungs:   Clear to auscultation bilaterally; no wheezes, rhonchi or rales; respirations unlabored   Heart:  RRR; no murmur, rub or gallop   Abdomen:   Soft, positive bowel sounds, tender blistering eruption on a pink base from left abdominal wall to left thoracic back  No active weeping and blisters are starting to scab and itch  Extremities: No clubbing or cyanosis, no edema, left arm IV site nontender   Skin: No new rashes or lesions  Labs, Imaging, & Other studies:   All pertinent labs and imaging studies were personally reviewed  Results from last 7 days   Lab Units 07/17/19  0353 07/16/19  0352 07/15/19  1406   WBC Thousand/uL 3 73* 4 30* 5 90   HEMOGLOBIN g/dL 12 4 11 8 13 5   PLATELETS Thousands/uL 228 219 276     Results from last 7 days   Lab Units 07/17/19  0353  07/15/19  1406   POTASSIUM mmol/L 3 9   < > 3 3*   CHLORIDE mmol/L 104   < > 100   CO2 mmol/L 30   < > 28   BUN mg/dL 6   < > 8   CREATININE mg/dL 0 84   < > 0 90   EGFR ml/min/1 73sq m 77   < > 71   CALCIUM mg/dL 8 3   < > 9 5   AST U/L 33  --  14   ALT U/L 35  --  17   ALK PHOS U/L 79  --  84    < > = values in this interval not displayed  Results from last 7 days   Lab Units 07/15/19  1645 07/15/19  1414 07/15/19  1406   BLOOD CULTURE   --  No Growth at 24 hrs  No Growth at 24 hrs     GRAM STAIN RESULT  2+ Mononuclear Cells  No Polys  No No bacteria seen  --   --

## 2019-07-17 NOTE — PHYSICAL THERAPY NOTE
PHYSICAL THERAPY Evaluation NOTE    Patient Name: Jessica Mcmahan  RDBSO'P Date: 7/17/2019     AGE:   64 y o  Mrn:   [id number]  ADMIT DX:  Herpes zoster [B02 9]  Shingles rash [B02 9]  Headache [R51]  Headache [R51]    Past Medical History:   Diagnosis Date    Breast cancer ([facility] )     RIGHT    Cancer ([facility] )     Guillain Barré syndrome ([facility] )     Hypertension      Length Of Stay: 2  PHYSICAL THERAPY EVALUATION :    07/17/19 1347   Note Type   Note type Eval only   Pain Assessment   Pain Assessment 0-10   Pain Score 4   Pain Type Acute pain   Pain Location Head   Home Living   Type of Home House  (1 SH, 1 KUMAR )   Home Layout One level   Bathroom Shower/Tub Walk-in shower   Bathroom Toilet Standard   Bathroom Equipment Grab bars in shower   Bathroom Accessibility Accessible   Home Equipment Walker;Cane  (PRN)   Additional Comments Pt  lives with spouse and family in [address] [place] [address] with 1 KUMAR   Prior Function   Level of [place] Independent with ADLs and functional mobility   Lives With [place] Help From Family   ADL Assistance Independent   IADLs Independent   Falls in the last 6 months 0   Vocational Full time employment   Comments PTA, Pt  reports INDEP with ADLs, IADLs and functional mobility with cane PRN and owns RW from Hx of GBS  Restrictions/Precautions   Other Precautions Contact/isolation; Fall Risk;Pain   General   Additional Pertinent History Pt  is a 65 yo F who presents with 8/10 headache x 5 days  Pt  reports as the weekend progressed the symptoms worsened and neck stiffened      Family/Caregiver Present Yes   Cognition   Overall Cognitive Status WFL   Arousal/Participation Cooperative   Orientation Level Oriented X4   Memory Within functional limits   Following Commands Follows all commands and directions without difficulty   Comments Pt  was identified with full name and birthdat   Strength RLE   R Hip Flexion 4-/5 R Knee Flexion 4-/5   R Knee Extension 4-/5   R Ankle Dorsiflexion 3+/5   R Ankle Plantar Flexion 3/5   Strength LLE   L Hip Flexion 4-/5   L Knee Flexion 4-/5   L Knee Extension 4-/5   L Ankle Dorsiflexion 3+/5   L Ankle Plantar Flexion 3/5   Coordination   Movements are Fluid and Coordinated 0   Coordination and Movement Description gait ataxia noted increasing with fatigue   Sensation WFL   Light Touch   RLE Light Touch Grossly intact   LLE Light Touch Grossly intact   Bed Mobility   Supine to Sit 5  Supervision   Additional items HOB elevated; Bedrails   Transfers   Sit to Stand 4  Minimal assistance   Additional items Assist x 1; Increased time required;Verbal cues  (for hand placement and sequencing)   Stand to Sit 4  Minimal assistance   Additional items Assist x 1; Impulsive;Verbal cues  (to reach back to control descent)   Ambulation/Elevation   Gait pattern Improper Weight shift;Narrow LUIS CARLOS; Forward Flexion;Decreased foot clearance;Shuffling; Inconsistent trey; Redundant gait at times; Short stride; Ataxia; Excessively slow   Gait Assistance 3  Moderate assist  (variable min<>mod)   Additional items Assist x 1   Assistive Device [address]   Distance 120'x1   Balance   Static Sitting Fair   Dynamic Sitting Fair -   Static Standing Fair -   Dynamic Standing Poor +   Ambulatory Poor   Endurance Deficit   Endurance Deficit Yes   Endurance Deficit Description postural and gait degradation with fatigue   Activity Tolerance   Activity Tolerance Patient limited by fatigue   Medical Staff Made Aware Spoke to Yefri Rosales   Nurse Made Aware Spoke to RN   Assessment   Prognosis Good   Problem List Decreased strength;Decreased range of motion;Decreased endurance; Impaired balance;Decreased mobility; Decreased coordination;Decreased safety awareness;Pain   Assessment Pt  is a 63 yo F who presents with 8/10 headache x 5 days  Pt  reports as the weekend progressed the symptoms worsened and neck stiffened   Dx: herpes Zoster with meningitis, order placed for PT eval and tx, w/ activity order of up and OOB as tolerated  pt presents w/ comorbidities of Htn, appendicitis, GBS and personal factors of mobilizing w/ assistive device, stair(s) to enter home, inability to navigate community distances, inability to navigate level surfaces w/o external assistance, unable to perform dynamic tasks in community, decreased initiation and engagement, inability to perform current job functions, unable to perform physical activity, inability to perform IADLs, inability to perform ADLs and inability to live alone  pt presents w/ pain, weakness, decreased ROM, decreased endurance, impaired balance, gait deviations, decreased safety awareness and fall risk  these impairments are evident in findings from physical examination (weakness, decreased ROM and impaired coordination), mobility assessment (need for Min/Mod assist w/ all phases of mobility when usually mobilizing independently, tolerance to only 120 feet of ambulation and need for cueing for mobility technique), and Barthel Index: 70/100  pt needed input for task focus and mobility technique  pt is at risk for falls due to physical and safety awareness deficits  pt's clinical presentation is unstable/unpredictable (evident in need for assist w/ all phases of mobility when usually mobilizing independently, tolerance to only 120 feet of ambulation, need for input for mobility technique, need for input for task focus and mobility technique and need for input for mobility technique/safety)  pt needs inpatient PT tx to improve mobility deficits  discharge recommendation is for inpatient rehab to reduce fall risk and maximize level of functional independence  Goals   Patient Goals to get back to normal and take it easy   STG Expiration Date 07/27/19   Short Term Goal #1 pt will:  Increase bilateral LE strength 1/2 grade to facilitate independent mobility, Perform all bed mobility tasks modified independent to decrease fall risk factors, Perform all transfers modified independent to improve independence, Ambulate 250 ft  with roller walker w/ supervision w/o LOB, Increase all balance 1/2 grade to decrease risk for falls and Improve Barthel Index score to 95 or greater to facilitate independence   Treatment Day 0   Plan   Treatment/Interventions LE strengthening/ROM; Functional transfer training; Therapeutic exercise; Endurance training;Cognitive reorientation;Patient/family training;Equipment eval/education;Spoke to nursing;Spoke to case management;Gait training;Bed mobility   PT Frequency   (3-5x/week)   Recommendation   Recommendation Post acute IP rehab   Equipment Recommended Walker   PT - OK to Discharge Yes   Additional Comments when medically appropriate to Acute Rehab   Barthel Index   Feeding 10   Bathing 0   Grooming Score 0   Dressing Score 10   Bladder Score 10   Bowels Score 10   Toilet Use Score 5   Transfers (Bed/Chair) Score 10   Mobility (Level Surface) Score 10   Stairs Score 5   Barthel Index Score 70     Skilled PT recommended while in hospital and upon DC to progress pt toward treatment goals       Daniel Vu, PT, DPT 7/17/2019

## 2019-07-17 NOTE — ASSESSMENT & PLAN NOTE
/62  Currently managed with Lisinopril 10 mg  Continue to monitor blood pressure and adjust dosage if necessary

## 2019-07-17 NOTE — PLAN OF CARE
Problem: PHYSICAL THERAPY ADULT  Goal: Performs mobility at highest level of function for planned discharge setting  See evaluation for individualized goals  Description  Treatment/Interventions: LE strengthening/ROM, Functional transfer training, Therapeutic exercise, Endurance training, Cognitive reorientation, Patient/family training, Equipment eval/education, Spoke to nursing, Spoke to case management, Gait training, Bed mobility  Equipment Recommended: Hannah Wolf       See flowsheet documentation for full assessment, interventions and recommendations  Outcome: Progressing  Note:   Prognosis: Good  Problem List: Decreased strength, Decreased range of motion, Decreased endurance, Impaired balance, Decreased mobility, Decreased coordination, Decreased safety awareness, Pain  Assessment: Pt  is a 63 yo F who presents with 8/10 headache x 5 days  Pt  reports as the weekend progressed the symptoms worsened and neck stiffened  Dx: herpes Zoster with meningitis, order placed for PT eval and tx, w/ activity order of up and OOB as tolerated  pt presents w/ comorbidities of Htn, appendicitis, GBS and personal factors of mobilizing w/ assistive device, stair(s) to enter home, inability to navigate community distances, inability to navigate level surfaces w/o external assistance, unable to perform dynamic tasks in community, decreased initiation and engagement, inability to perform current job functions, unable to perform physical activity, inability to perform IADLs, inability to perform ADLs and inability to live alone  pt presents w/ pain, weakness, decreased ROM, decreased endurance, impaired balance, gait deviations, decreased safety awareness and fall risk   these impairments are evident in findings from physical examination (weakness, decreased ROM and impaired coordination), mobility assessment (need for Min/Mod assist w/ all phases of mobility when usually mobilizing independently, tolerance to only 120 feet of ambulation and need for cueing for mobility technique), and Barthel Index: 70/100  pt needed input for task focus and mobility technique  pt is at risk for falls due to physical and safety awareness deficits  pt's clinical presentation is unstable/unpredictable (evident in need for assist w/ all phases of mobility when usually mobilizing independently, tolerance to only 120 feet of ambulation, need for input for mobility technique, need for input for task focus and mobility technique and need for input for mobility technique/safety)  pt needs inpatient PT tx to improve mobility deficits  discharge recommendation is for inpatient rehab to reduce fall risk and maximize level of functional independence  Recommendation: Post acute IP rehab     PT - OK to Discharge: Yes    See flowsheet documentation for full assessment

## 2019-07-17 NOTE — CONSULTS
This 56-year-old woman is hospitalized for viral meningitis and complains of light sensitivity right eye more than left for several days  She has a history of early glaucoma and is followed by [facility] for sight  She is on no ophthalmic medications  Visual acuity with correction at near is 20/50 and 20/70  Pupils are equal round reactive to light with no afferent defect  Extraocular movements are full  The external examination is unremarkable  The corneas show no staining  The anterior chambers are formed  The iris and pupil appear normal both eyes  Intra-ocular pressures are 11 and 12  A dull red reflex is noted in both eyes  There is no view of the posterior pole  Impression:  Dry eye syndrome  Plan:  Hot compresses 3 times a day and artificial tears 3 times a day  Please re-consult if symptoms persist   The patient will continue following with her regular eye care provider after discharge

## 2019-07-18 LAB
ANION GAP SERPL CALCULATED.3IONS-SCNC: 7 MMOL/L (ref 4–13)
BACTERIA CSF CULT: NO GROWTH
BASOPHILS # BLD AUTO: 0 X10E3/UL (ref 0–0.2)
BASOPHILS NFR BLD AUTO: 0 %
BUN SERPL-MCNC: 8 MG/DL (ref 5–25)
CALCIUM SERPL-MCNC: 8.5 MG/DL (ref 8.3–10.1)
CD3+CD4+ CELLS # BLD: 455 /UL (ref 359–1519)
CD3+CD4+ CELLS NFR BLD: 50.6 % (ref 30.8–58.5)
CHLORIDE SERPL-SCNC: 107 MMOL/L (ref 100–108)
CO2 SERPL-SCNC: 28 MMOL/L (ref 21–32)
CREAT SERPL-MCNC: 0.81 MG/DL (ref 0.6–1.3)
EOSINOPHIL # BLD AUTO: 0.1 X10E3/UL (ref 0–0.4)
EOSINOPHIL NFR BLD AUTO: 2 %
ERYTHROCYTE [DISTWIDTH] IN BLOOD BY AUTOMATED COUNT: 14.6 % (ref 11.6–15.1)
ERYTHROCYTE [DISTWIDTH] IN BLOOD BY AUTOMATED COUNT: 15.1 % (ref 12.3–15.4)
GFR SERPL CREATININE-BSD FRML MDRD: 81 ML/MIN/1.73SQ M
GLUCOSE SERPL-MCNC: 97 MG/DL (ref 65–140)
HCT VFR BLD AUTO: 38.4 % (ref 34.8–46.1)
HCT VFR BLD AUTO: 38.5 % (ref 34–46.6)
HGB BLD-MCNC: 12 G/DL (ref 11.5–15.4)
HGB BLD-MCNC: 12.4 G/DL (ref 11.1–15.9)
IMM GRANULOCYTES # BLD: 0 X10E3/UL (ref 0–0.1)
IMM GRANULOCYTES NFR BLD: 0 %
LYMPHOCYTES # BLD AUTO: 0.9 X10E3/UL (ref 0.7–3.1)
LYMPHOCYTES NFR BLD AUTO: 22 %
MCH RBC QN AUTO: 26.7 PG (ref 26.8–34.3)
MCH RBC QN AUTO: 26.8 PG (ref 26.6–33)
MCHC RBC AUTO-ENTMCNC: 31.3 G/DL (ref 31.4–37.4)
MCHC RBC AUTO-ENTMCNC: 32.2 G/DL (ref 31.5–35.7)
MCV RBC AUTO: 83 FL (ref 79–97)
MCV RBC AUTO: 86 FL (ref 82–98)
MONOCYTES # BLD AUTO: 0.4 X10E3/UL (ref 0.1–0.9)
MONOCYTES NFR BLD AUTO: 9 %
NEUTROPHILS # BLD AUTO: 2.6 X10E3/UL (ref 1.4–7)
NEUTROPHILS NFR BLD AUTO: 67 %
PLATELET # BLD AUTO: 234 THOUSANDS/UL (ref 149–390)
PLATELET # BLD AUTO: 243 X10E3/UL (ref 150–450)
PMV BLD AUTO: 9.3 FL (ref 8.9–12.7)
POTASSIUM SERPL-SCNC: 4 MMOL/L (ref 3.5–5.3)
RBC # BLD AUTO: 4.49 MILLION/UL (ref 3.81–5.12)
RBC # BLD AUTO: 4.63 X10E6/UL (ref 3.77–5.28)
SODIUM SERPL-SCNC: 142 MMOL/L (ref 136–145)
WBC # BLD AUTO: 3.63 THOUSAND/UL (ref 4.31–10.16)
WBC # BLD AUTO: 3.9 X10E3/UL (ref 3.4–10.8)

## 2019-07-18 PROCEDURE — 97166 OT EVAL MOD COMPLEX 45 MIN: CPT

## 2019-07-18 PROCEDURE — 99232 SBSQ HOSP IP/OBS MODERATE 35: CPT | Performed by: INTERNAL MEDICINE

## 2019-07-18 PROCEDURE — 85027 COMPLETE CBC AUTOMATED: CPT | Performed by: PHYSICIAN ASSISTANT

## 2019-07-18 PROCEDURE — 99233 SBSQ HOSP IP/OBS HIGH 50: CPT | Performed by: INTERNAL MEDICINE

## 2019-07-18 PROCEDURE — G8989 SELF CARE D/C STATUS: HCPCS

## 2019-07-18 PROCEDURE — G8987 SELF CARE CURRENT STATUS: HCPCS

## 2019-07-18 PROCEDURE — G8988 SELF CARE GOAL STATUS: HCPCS

## 2019-07-18 PROCEDURE — 80048 BASIC METABOLIC PNL TOTAL CA: CPT | Performed by: PHYSICIAN ASSISTANT

## 2019-07-18 RX ADMIN — PANTOPRAZOLE SODIUM 40 MG: 40 TABLET, DELAYED RELEASE ORAL at 05:22

## 2019-07-18 RX ADMIN — SENNOSIDES 17.2 MG: 8.6 TABLET, FILM COATED ORAL at 09:57

## 2019-07-18 RX ADMIN — SENNOSIDES 17.2 MG: 8.6 TABLET, FILM COATED ORAL at 17:42

## 2019-07-18 RX ADMIN — ACETAMINOPHEN 650 MG: 325 TABLET, FILM COATED ORAL at 12:52

## 2019-07-18 RX ADMIN — ACYCLOVIR SODIUM 675 MG: 50 INJECTION, SOLUTION INTRAVENOUS at 22:22

## 2019-07-18 RX ADMIN — ENOXAPARIN SODIUM 40 MG: 40 INJECTION SUBCUTANEOUS at 09:56

## 2019-07-18 RX ADMIN — CITALOPRAM HYDROBROMIDE 20 MG: 20 TABLET ORAL at 09:57

## 2019-07-18 RX ADMIN — ACYCLOVIR SODIUM 675 MG: 50 INJECTION, SOLUTION INTRAVENOUS at 02:09

## 2019-07-18 RX ADMIN — ACETAMINOPHEN 650 MG: 325 TABLET, FILM COATED ORAL at 05:22

## 2019-07-18 RX ADMIN — ACYCLOVIR SODIUM 675 MG: 50 INJECTION, SOLUTION INTRAVENOUS at 14:37

## 2019-07-18 RX ADMIN — HYDROXYZINE HYDROCHLORIDE 25 MG: 25 TABLET ORAL at 22:21

## 2019-07-18 RX ADMIN — LISINOPRIL 10 MG: 10 TABLET ORAL at 09:57

## 2019-07-18 RX ADMIN — BISACODYL 10 MG: 10 SUPPOSITORY RECTAL at 09:58

## 2019-07-18 RX ADMIN — ACETAMINOPHEN 650 MG: 325 TABLET, FILM COATED ORAL at 17:42

## 2019-07-18 NOTE — SOCIAL WORK
CM reviewed Acute and SNF list at the bedside with patient  Patient will speak with her  and choose facilities for inpatient rehab/IV treatment

## 2019-07-18 NOTE — SOCIAL WORK
CM spoke with patient's  on the phone  Patient's  updated CM spoke with patient at the bedside   aware that PT recommendation is inpatient rehab and ID recommendation is long term IV treatment in a facility setting  Patient's  aware that CM is providing a list of both acute and SNF facilities at the bedside to review  CM contact information provided to patient's   All questions answered at this time

## 2019-07-18 NOTE — PLAN OF CARE
Problem: DISCHARGE PLANNING - CARE MANAGEMENT  Goal: Discharge to post-acute care or home with appropriate resources  Description  INTERVENTIONS:  - Conduct assessment to determine patient/family and health care team treatment goals, and need for post-acute services based on payer coverage, community resources, and patient preferences, and barriers to discharge  - Address psychosocial, clinical, and financial barriers to discharge as identified in assessment in conjunction with the patient/family and health care team  - Arrange appropriate level of post-acute services according to patients   needs and preference and payer coverage in collaboration with the physician and health care team  - Communicate with and update the patient/family, physician, and health care team regarding progress on the discharge plan  - Arrange appropriate transportation to post-acute venues  Outcome: Progressing  Note:   LOS 3  Patient is not a bundle or a readmission  CM spoke with patient at the bedside  Patient lives in [place] with her  and children in a ranch home  There are 2 KUMAR home  Patient stated she has a cane, roller walker and bedside commode at home  She uses the cane/roller walker as needed  Patient is independent with ADLS  Patient's family assists with meals and cleaning  Patient has a history of VNA in the past  Unable to remember agency name  Denies inpatient rehab  Patient has had Outpatient PT in the past  Patient uses [facility] pharmacy in [place]  Patient has prescription insurance and is able to afford her medications  Patient does not have an advance directive/living will  Declined at this time  Patient's  Judy is her emergency contact, [phone]  Patient currently owns a cleaning business and drives  Patient's family is able to transport at discharge  Patient's PCP is Dr Twan Gonzalez  Patient denies history of drug/alcohol abuse and mental illness history       CM discussed with patient at the bedside re:DCP  Patient updated that per ID, the recommendation is for long term IV ABX  Patient aware that PT is recommending inpatient rehab at discharge  Patient educated on [place] of [facility]  Patient educated on the difference between acute vs  SNF level rehab  CM will provided a copy of both Acute and SNF lists at the bedside for patient to review  Patient requesting CM update her  with plan of care  CM left a voice message with patient's   Waiting for callback to discuss discharge plan of care  CM reviewed discharge planning process including the following: identifying caregivers at home, preference for d/c planning needs,   availability of [facility] program, availability of treatment team to discuss questions or concerns patient and/or family may have regarding diagnosis, plan of care, old or new medications and discharge planning   CM will continue to follow for care coordination and update assessment as appropriate

## 2019-07-18 NOTE — PROGRESS NOTES
Progress Note - Malgorzata Mccoy 1960, 64 y o  female MRN: [id number]    Unit/Bed#: -01 Encounter: [id number]    Primary Care Provider: Magalie Conde MD   Date and time admitted to hospital: 7/15/2019  1:18 PM        * Herpes zoster with meningitis  Assessment & Plan  · Ophthalmology stated that it is likely a dry eye syndrome and recommended warm compresses  · Occupational therapy and Physical therapy have signed off on her with no recommendation for further therapy  · Discussed with case management the possibility of discharge tomorrow to an acute rehab facility for continuation of treatment  · Patient is currently on contact precaution  · Patient no longer complains of headache, photophobia, or limited range of motion of the neck  · Pain medications PRN  · Vesicular rash along T7 dermatome on left anterior and posterolateral aspect of the thorax-rash improved, but still present  · A bandage was placed on top of the rash because the patient complained of itching  · Infectious Diseases recommends 2 week protocol of acyclovir IV  · BUN is 8 and creatinine is 0 81-we will continue to evaluate kidney function until acyclovir is stopped  · White blood cell was decreased to 3 63 from 3 73 yesterday-ID awaiting CD4 count, HIV negative, hepatitis negative  · Continue treatment with IV Acyclovir 10mg/kg q8h and monitor patient-there was difficulty obtaining IV access this morning    If IV access is not obtained by the afternoon, will consider consult IR for venous line access  · PICC line consent obtained and consult was ordered for PICC line insertion    Essential hypertension  Assessment & Plan  /70  Currently managed with Lisinopril 10 mg  Continue to monitor blood pressure and adjust dosage if necessary    VTE Pharmacologic Prophylaxis:   Pharmacologic: Enoxaparin (Lovenox)  Mechanical VTE Prophylaxis in Place: Yes    Patient Centered Rounds: I have performed bedside rounds with nursing staff today     Discussions with Specialists or Other Care Team Provider:  Resident care team    Education and Discussions with Family / Patient:  Discussed with patient    Time Spent for Care: 30 minutes  More than 50% of total time spent on counseling and coordination of care as described above  Current Length of Stay: 3 day(s)    Current Patient Status: Inpatient   Certification Statement: The patient will continue to require additional inpatient hospital stay due to Insertion and maintenance of a PICC line for discharge tomorrow    Discharge Plan:  Insert PICC line today and discharged to acute rehab facility for 2 weeks    Code Status: Level 1 - Full Code      Subjective:   Patient endorses symptoms have improved  She no longer complains of a headache except for when performing Valsalva maneuver while defecating  Patient states she has improved neck range of motion  Review of Systems   Constitutional: Negative for activity change, appetite change, fatigue and fever  Gastrointestinal: Negative for nausea  Genitourinary: Negative for dysuria  Musculoskeletal: Negative for back pain, neck pain and neck stiffness  Skin: Positive for rash  All other systems reviewed and are negative  Objective:     Vitals:   Temp (24hrs), Av 2 °F (36 8 °C), Min:98 °F (36 7 °C), Max:98 4 °F (36 9 °C)    Temp:  [98 °F (36 7 °C)-98 4 °F (36 9 °C)] 98 4 °F (36 9 °C)  HR:  [57-62] 57  Resp:  [18] 18  BP: (115-156)/(58-70) 120/70  SpO2:  [97 %] 97 %  Body mass index is 34 37 kg/m²  Input and Output Summary (last 24 hours): Intake/Output Summary (Last 24 hours) at 2019 1313  Last data filed at 2019 1817  Gross per 24 hour   Intake 760 ml   Output    Net 760 ml       Physical Exam:     Physical Exam   Constitutional: She is oriented to person, place, and time  She appears well-developed and well-nourished  No distress  HENT:   Head: Normocephalic and atraumatic     Eyes: Pupils are equal, round, and reactive to light  Conjunctivae and EOM are normal    Neck: Neck supple  Cardiovascular: Normal rate, regular rhythm, S1 normal, S2 normal, normal heart sounds and intact distal pulses  Exam reveals no gallop and no friction rub  No murmur heard  Pulmonary/Chest: Effort normal and breath sounds normal  No accessory muscle usage  No respiratory distress  She has no wheezes  She has no rales  Abdominal: Soft  Bowel sounds are normal  She exhibits no distension and no mass  There is no tenderness  Musculoskeletal: Normal range of motion  She exhibits no edema  Lymphadenopathy:     She has no cervical adenopathy  Neurological: She is alert and oriented to person, place, and time  Skin: Skin is warm, dry and intact  Psychiatric: She has a normal mood and affect  Nursing note and vitals reviewed  Additional Data:     Labs:    Results from last 7 days   Lab Units 07/18/19  0450 07/17/19  0353   WBC Thousand/uL 3 63* 3 73*   HEMOGLOBIN g/dL 12 0 12 4   HEMATOCRIT % 38 4 39 7   PLATELETS Thousands/uL 234 228   NEUTROS PCT %  --  70   LYMPHS PCT %  --  20   MONOS PCT %  --  9   EOS PCT %  --  1     Results from last 7 days   Lab Units 07/18/19  0450 07/17/19  0353   SODIUM mmol/L 142 141   POTASSIUM mmol/L 4 0 3 9   CHLORIDE mmol/L 107 104   CO2 mmol/L 28 30   BUN mg/dL 8 6   CREATININE mg/dL 0 81 0 84   ANION GAP mmol/L 7 7   CALCIUM mg/dL 8 5 8 3   ALBUMIN g/dL  --  3 1*   TOTAL BILIRUBIN mg/dL  --  0 40   ALK PHOS U/L  --  79   ALT U/L  --  35   AST U/L  --  33   GLUCOSE RANDOM mg/dL 97 110     Results from last 7 days   Lab Units 07/15/19  1406   INR  1 01                       * I Have Reviewed All Lab Data Listed Above  * Additional Pertinent Lab Tests Reviewed:  Ce 66 Admission Reviewed    Imaging:    Imaging Reports Reviewed Today Include: None  Imaging Personally Reviewed by Myself Includes:  None    Recent Cultures (last 7 days):     Results from last 7 days   Lab Units 07/15/19  1645 07/15/19  1414 07/15/19  1406   BLOOD CULTURE   --  No Growth at 48 hrs  No Growth at 48 hrs  GRAM STAIN RESULT  2+ Mononuclear Cells  No Polys  No No bacteria seen  --   --        Last 24 Hours Medication List:     Current Facility-Administered Medications:  acetaminophen 650 mg Oral Q6H [address] Vito FARZANEH Flower MD    acyclovir 10 mg/kg (Adjusted) Intravenous Q8H Raghav Fails, MD Last Rate: 675 mg (07/18/19 0209)   bisacodyl 10 mg Rectal Daily PRN Jan Sourav PAEricC    citalopram 20 mg Oral Daily Raghav Fails, MD    cyclobenzaprine 10 mg Oral TID PRN Raghav Fails, MD    enoxaparin 40 mg Subcutaneous Daily Raghav Fails, MD    HYDROmorphone 0 2 mg Intravenous Q3H PRN Raghav Fails, MD    hydrOXYzine HCL 25 mg Oral HS Raghav Fails, MD    linaCLOtide 145 mcg Oral Daily Raghav Fails, MD    lisinopril 10 mg Oral Daily Gueydan Natalie Flower MD    ondansetron 4 mg Intravenous Q6H PRN Jan ALLYSSA BenjaminC    oxyCODONE 2 5 mg Oral Q4H PRN Raghav Fails, MD    Or        oxyCODONE 5 mg Oral Q4H PRN Raghav Fails, MD    pantoprazole 40 mg Oral Early Morning Raghav Fails, MD    polyvinyl alcohol 1 drop Both Eyes Q3H PRN Jan JOHN Benjamin    senna 2 tablet Oral BID Jan JOHN Benjamin         Today, Patient Was Seen By: Kathy eLigh DO    ** Please Note: Dictation voice to text software may have been used in the creation of this document   **

## 2019-07-18 NOTE — ASSESSMENT & PLAN NOTE
· Ophthalmology stated that it is likely a dry eye syndrome and recommended warm compresses  · Occupational therapy and Physical therapy have signed off on her with no recommendation for further therapy  · Discussed with case management the possibility of discharge tomorrow to an acute rehab facility for continuation of treatment  · Patient is currently on contact precaution  · Patient no longer complains of headache, photophobia, or limited range of motion of the neck  · Pain medications PRN  · Vesicular rash along T7 dermatome on left anterior and posterolateral aspect of the thorax-rash improved, but still present  · A bandage was placed on top of the rash because the patient complained of itching  · Infectious Diseases recommends 2 week protocol of acyclovir IV  · BUN is 8 and creatinine is 0 81-we will continue to evaluate kidney function until acyclovir is stopped  · White blood cell was decreased to 3 63 from 3 73 yesterday-ID awaiting CD4 count, HIV negative, hepatitis negative  · Continue treatment with IV Acyclovir 10mg/kg q8h and monitor patient-there was difficulty obtaining IV access this morning    If IV access is not obtained by the afternoon, will consider consult IR for venous line access  · PICC line consent obtained and consult was ordered for PICC line insertion

## 2019-07-18 NOTE — OCCUPATIONAL THERAPY NOTE
OccupationalTherapy Evaluation     Patient Name: Susannah Ivey  ULHJR'Q Date: 7/18/2019  Problem List  Patient Active Problem List   Diagnosis    Appendicitis, acute    Essential hypertension    Guillain Barré syndrome (HCC)    Herpes zoster with meningitis     Past Medical History  Past Medical History:   Diagnosis Date    Breast cancer ([facility] )     RIGHT    Cancer ([facility] )     Guillain Barré syndrome ([facility] )     Hypertension      Past Surgical History  Past Surgical History:   Procedure Laterality Date    MASTECTOMY, RADICAL Bilateral     [place] LAP,APPENDECTOMY N/A 4/11/2017    Procedure: APPENDECTOMY LAPAROSCOPIC;  Surgeon: Martinez Manuel DO;  [facility];  Service: General    TOTAL KNEE ARTHROPLASTY Right         07/18/19 1518   Note Type   Note type Eval only   Restrictions/Precautions   Other Precautions Contact/isolation; Fall Risk;Pain   Pain Assessment   Pain Assessment FLACC   Pain Type Acute pain   Pain Location Arm   Pain Orientation Left;Mid  (at IV site elbow/ forearm)   Hospital Pain Intervention(s) Repositioned; Ambulation/increased activity; Emotional support   Pain Rating: FLACC (Rest) - Face 0   Pain Rating: FLACC (Rest) - Legs 0   Pain Rating: FLACC (Rest) - Activity 0   Pain Rating: FLACC (Rest) - Cry 0   Pain Rating: FLACC (Rest) - Consolability 0   Score: FLACC (Rest) 0   Pain Rating: FLACC (Activity) - Face 1   Pain Rating: FLACC (Activity) - Legs 0   Pain Rating: FLACC (Activity) - Activity 0   Pain Rating: FLACC (Activity) - Cry 1   Pain Rating: FLACC (Activity) - Consolability 1   Score: FLACC (Activity) 3   Home Living   Type of Home House   Home Layout One level; Other (Comment); Performs ADLs on one level  (1 KUMAR)   Bathroom Shower/Tub Tub/shower unit   Bathroom Toilet Standard   Bathroom Equipment Grab bars in shower   [address] Cane;Grab bars; Other (Comment)  (not using cane)   Additional Comments Pt reports living w/ spouse and 2 children in 1   Prior Function   Level of [place] Independent with ADLs and functional mobility   Lives With Spouse   ADL Assistance Independent   IADLs Independent   Falls in the last 6 months 1 to 4  (pt reports 3)   Vocational Full time employment   Comments Pt reports I w/ ADL / IADL and keeps cane in her car  Lifestyle   Autonomy Pt reports I w/ ADL/ IADL including driving and working full time   Reciprocal Relationships Pt reports supportive family  Pt reports her daughter leaves for [place] [employer] Aug 22 and is currently working at [employer] (intern) and her son is commuting to [employer] to Others 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   Intrinsic Gratification Pt reports enjoying going to the gym when her knee is not bothering her   ADL   Eating Assistance 7  Independent   Eating Deficit Setup   Grooming Assistance 6  Modified Independent   Grooming Deficit Increased time to complete  (to due discomfort at IV site L UE)   UB Bathing Assistance Unable to assess  (demo ROM / coordination to complete)   LB Bathing Assistance Unable to assess   UB Dressing Assistance 6  Modified independent   UB Dressing Deficit Increased time to complete; Fasteners;Setup   LB Dressing Assistance 6  Modified independent   LB Dressing Deficit Setup; Increased time to complete; Fasteners   Toileting Assistance  6  Modified independent   Toileting Deficit Grab bar use;Verbal cueing;Supervison/safety   Additional Comments increased time to complete dressing due to guraded/limited use of L UE due to sore from IV insertion   Bed Mobility   Supine to Sit 6  Modified independent   Additional items HOB elevated; Increased time required   Sit to Supine Unable to assess   Additional Comments Pt seated OOB in chair post eval w/ needs met, call bell in reach and lunch tray present   Transfers   Sit to Stand 5  Supervision   Additional items Assist x 1   Stand to Sit 5  Supervision   Additional items Assist x 1   Toilet transfer 5  Supervision   Additional items Assist x 1;Standard toilet  (grab bar use sit to stand)   Functional Mobility   Functional Mobility 5  Supervision   Additional items [facility]   Balance   Static Sitting Good   Static Standing Fair +   Ambulatory Fair -   Activity Tolerance   Activity Tolerance Patient limited by pain   Medical Staff Made Aware spoke to Mary DYER   Nurse Made Aware spoke to Ritesh GIORDANO Assessment   RUE Assessment [facility]   RU Strength   RUE Overall Strength Within Functional Limits - able to perform ADL tasks with strength   LUE Assessment   LUE Assessment WFL   LUE Strength   LUE Overall Strength Deficits;Due to pain; Other (Comment)  (grasp strength WFL; due to pain at IV site)   Hand Function   Gross Motor Coordination Functional   Fine Motor Coordination Functional  (R hand dominance)   Sensation   Light Touch No apparent deficits  (B UE)   Sharp/Dull Not tested   Vision-Basic Assessment   Current Vision Wears glasses all the time   Vision - Complex Assessment   Acuity Able to read clock/calendar on wall without difficulty; Able to read employee name badge without difficulty   Cognition   Overall Cognitive Status [facility]   Arousal/Participation Alert; Cooperative   Attention Attends with cues to redirect   Orientation Level Oriented X4   Memory Within functional limits   Following Commands Follows all commands and directions without difficulty   Comments Identified pt by full name and birthdate  Pt able to participate in conversation appropriately and follow directions  Assessment   Limitation Decreased high-level ADLs   Assessment Pt is a 62yo female [facility]  Pt presents w/ herpes zoster w/ meningitis and significant PMH impacting her occupational performance including R TKA, Guillain - [place], breast ca hx, HTN, radical mastectomy  Pt reports living w/  and family in [address] [place] [address] PTA   Pt reports I w/ ADL/ IADL using cane PRN as needed for community mobility  Upon eval, pt complete bed mobility supine to sit w/ mod I w/ HOB elevated  Pt engaged in functional mobility using cane w/ S  Pt completed UBD/LBD w/ mod I after set- up and increased time due to discomfort at IV site L UE  Pt completed toileting w/ mod I, and grooming standing at sink  Pt alert, oriented, and able to participate in conversation appropriately  Pt completing ADL at / near baseline level of I  Recommend active participation in ADL w/ nursing staff while in acute care  No additional OT needs at this time   D/ C OT   Goals   Patient Goals Pt stated that she would like to go to rehab for IV meds and then return home   Recommendation   OT Discharge Recommendation   (pending progress)   Equipment Recommended Tub seat with back   Barthel Index   Feeding 10   Bathing 0   Grooming Score 5   Dressing Score 10   Bladder Score 10   Bowels Score 10   Toilet Use Score 10   Transfers (Bed/Chair) Score 10   Mobility (Level Surface) Score 10   Stairs Score 5   Barthel Index Score 80   Jazzy Okeefe, OTR/L

## 2019-07-18 NOTE — PLAN OF CARE
Problem: Potential for Falls  Goal: Patient will remain free of falls  Description  INTERVENTIONS:  - Assess patient frequently for physical needs  -  Identify cognitive and physical deficits and behaviors that affect risk of falls    -  [place] fall precautions as indicated by assessment   - Educate patient/family on patient safety including physical limitations  - Instruct patient to call for assistance with activity based on assessment  - Modify environment to reduce risk of injury  - Consider OT/PT consult to assist with strengthening/mobility  Outcome: Progressing

## 2019-07-18 NOTE — SOCIAL WORK
LOS 3  Patient is not a bundle or a readmission  CM spoke with patient at the bedside  Patient lives in [place] with her  and children in a ranch home  There are 2 KUMAR home  Patient stated she has a cane, roller walker and bedside commode at home  She uses the cane/roller walker as needed  Patient is independent with ADLS  Patient's family assists with meals and cleaning  Patient has a history of VNA in the past  Unable to remember agency name  Denies inpatient rehab  Patient has had Outpatient PT in the past  Patient uses [facility] pharmacy in [place]  Patient has prescription insurance and is able to afford her medications  Patient does not have an advance directive/living will  Declined at this time  Patient's  Judy is her emergency contact, [phone]  Patient currently owns a cleaning business and drives  Patient's family is able to transport at discharge  Patient's PCP is Dr Michelle Radford  Patient denies history of drug/alcohol abuse and mental illness history  CM discussed with patient at the bedside re:DCP  Patient updated that per ID, the recommendation is for long term IV treatment  Patient aware that PT is recommending inpatient rehab at discharge  Patient educated on [facility]  Patient educated on the difference between acute vs  SNF level rehab  CM will provided a copy of both Acute and SNF lists at the bedside for patient to review  Patient requesting CM update her  with plan of care  CM left a voice message with patient's   Waiting for callback to discuss discharge plan of care  CM reviewed discharge planning process including the following: identifying caregivers at home, preference for d/c planning needs,   availability of [facility] program, availability of treatment team to discuss questions or concerns patient and/or family may have regarding diagnosis, plan of care, old or new medications and discharge planning   CM will continue to follow for care coordination and update assessment as appropriate

## 2019-07-18 NOTE — ASSESSMENT & PLAN NOTE
/70  Currently managed with Lisinopril 10 mg  Continue to monitor blood pressure and adjust dosage if necessary

## 2019-07-18 NOTE — PROGRESS NOTES
Progress Note - Infectious Disease   Malgorzata Mccoy 64 y o  female MRN: [id number]  Unit/Bed#: -01 Encounter: [id number]      Impression/Plan:  1  Herpes zoster meningitis   Patient presents with severe 10/10 headache, neck stiffness about 5 days after shingles eruption on left abdomen  CSF VZV +  Rec:  · Acyclovir 10 mg/adjusted kg for BMI >30/q 8 hours on board  · Continue IV acyclovir x 14 days and monitor closely for tolerance, improvement and daily kidney function through 7/28/19  · Scripts written for outpatient acyclovir and every other day BMP, discussed with case management, and placed in outpatient folder  · Patient is cleared for PICC placement when feasible for protracted IV acyclovir course  · Monitor Creatine daily  · Monitor headache and clinically  · Monitor temperature and hemodynamics  · Monitor CBC  · Ophthalmology consult appreciated      2   Herpes zoster rash left T7 dermatome   Patient presents with severe 10/10 headache, neck stiffness about 5 days after painful herpes zoster eruption developed on left abdomen  CSF VZV +  Rec:  · Antiviral as above  · Serial exams   · burows soaks as needed to dry blisters     3   History of Guillain-[place]   In 2007  Rec:  · Serial exam  · Additional supportive care per primary care team      4  Renal Fxn WNL  Rec:  · Monitor creatine closely on 10 day course of IV Acyclovir     5  Mild leukopenia  Consistent with viral infection  Rec:  Monitor CBC     Antiinfective:  Acyclovir IV day 3     Discussed in detail with patient, ICU PICC RN,  and Dr Tiffanie Erazo  All of many questions answered and reassurance given  Outpatient antiviral IV coordination and counseling requiring 20 minutes of 35 minutes exam      Subjective:  Patient has no fever, chills, sweats; no nausea, vomiting, diarrhea; no cough, shortness of breath; Headache is greatly improved with lights on an room and shades open    Only increased headache discomfort was with bearing down for bowel movement this morning  No dysuria  No new symptoms        Objective:  Vitals:  Temp:  [98 °F (36 7 °C)-98 6 °F (37 °C)] 98 6 °F (37 °C)  HR:  [57-72] 72  Resp:  [18] 18  BP: (120-156)/(69-81) 132/81  SpO2:  [97 %] 97 %  Temp (24hrs), Av 3 °F (36 8 °C), Min:98 °F (36 7 °C), Max:98 6 °F (37 °C)  Current: Temperature: 98 6 °F (37 °C)    Physical Exam:   General Appearance:  Alert, interactive, nontoxic, no acute distress  Lights on in room and shades open   Eyes EOMI, PERRL   Neck: Supple, no neck stiffness   Throat: Oropharynx moist without lesions  Lungs:   Clear to auscultation bilaterally; no wheezes, rhonchi or rales; respirations unlabored   Heart:  RRR; no murmur, rub or gallop   Abdomen:   Soft, non-tender, non-distended, positive bowel sounds  Less tender blistery eruption on a pink base from left abdominal wall to left thoracic back  No active weeping and blisters are starting to scab  Itching is less   Extremities: No clubbing or cyanosis, no edema   Skin: No new rashes or lesions  No draining wounds noted  Labs, Imaging, & Other studies:   All pertinent labs and imaging studies were personally reviewed  Results from last 7 days   Lab Units 19  0450 19  0353 19  0352   WBC Thousand/uL 3 63* 3 73* 4 30*   HEMOGLOBIN g/dL 12 0 12 4 11 8   PLATELETS Thousands/uL 234 228 219     Results from last 7 days   Lab Units 19  0450 19  0353  07/15/19  1406   POTASSIUM mmol/L 4 0 3 9   < > 3 3*   CHLORIDE mmol/L 107 104   < > 100   CO2 mmol/L 28 30   < > 28   BUN mg/dL 8 6   < > 8   CREATININE mg/dL 0 81 0 84   < > 0 90   EGFR ml/min/1 73sq m 81 77   < > 71   CALCIUM mg/dL 8 5 8 3   < > 9 5   AST U/L  --  33  --  14   ALT U/L  --  35  --  17   ALK PHOS U/L  --  79  --  84    < > = values in this interval not displayed  Results from last 7 days   Lab Units 07/15/19  1645 07/15/19  1414 07/15/19  1406   BLOOD CULTURE   --  No Growth at 48 hrs   No Growth at 48 hrs    GRAM STAIN RESULT  2+ Mononuclear Cells  No Polys  No No bacteria seen  --   --

## 2019-07-19 ENCOUNTER — TELEPHONE (OUTPATIENT)
Dept: OTHER | Facility: OTHER | Age: 59
End: 2019-07-19

## 2019-07-19 VITALS
OXYGEN SATURATION: 98 % | HEART RATE: 64 BPM | TEMPERATURE: 98.2 F | RESPIRATION RATE: 18 BRPM | HEIGHT: 63 IN | DIASTOLIC BLOOD PRESSURE: 65 MMHG | WEIGHT: 194 LBS | SYSTOLIC BLOOD PRESSURE: 125 MMHG | BODY MASS INDEX: 34.38 KG/M2

## 2019-07-19 DIAGNOSIS — B02.1 HERPES ZOSTER WITH MENINGITIS: Primary | ICD-10-CM

## 2019-07-19 LAB
ANION GAP SERPL CALCULATED.3IONS-SCNC: 7 MMOL/L (ref 4–13)
BUN SERPL-MCNC: 9 MG/DL (ref 5–25)
CALCIUM SERPL-MCNC: 8.4 MG/DL (ref 8.3–10.1)
CHLORIDE SERPL-SCNC: 106 MMOL/L (ref 100–108)
CO2 SERPL-SCNC: 27 MMOL/L (ref 21–32)
CREAT SERPL-MCNC: 0.81 MG/DL (ref 0.6–1.3)
ERYTHROCYTE [DISTWIDTH] IN BLOOD BY AUTOMATED COUNT: 14.6 % (ref 11.6–15.1)
GFR SERPL CREATININE-BSD FRML MDRD: 81 ML/MIN/1.73SQ M
GLUCOSE SERPL-MCNC: 94 MG/DL (ref 65–140)
HCT VFR BLD AUTO: 38.5 % (ref 34.8–46.1)
HGB BLD-MCNC: 12 G/DL (ref 11.5–15.4)
MCH RBC QN AUTO: 26.4 PG (ref 26.8–34.3)
MCHC RBC AUTO-ENTMCNC: 31.2 G/DL (ref 31.4–37.4)
MCV RBC AUTO: 85 FL (ref 82–98)
PLATELET # BLD AUTO: 241 THOUSANDS/UL (ref 149–390)
PMV BLD AUTO: 9.2 FL (ref 8.9–12.7)
POTASSIUM SERPL-SCNC: 4 MMOL/L (ref 3.5–5.3)
RBC # BLD AUTO: 4.55 MILLION/UL (ref 3.81–5.12)
SODIUM SERPL-SCNC: 140 MMOL/L (ref 136–145)
WBC # BLD AUTO: 4.06 THOUSAND/UL (ref 4.31–10.16)

## 2019-07-19 PROCEDURE — 80048 BASIC METABOLIC PNL TOTAL CA: CPT | Performed by: INTERNAL MEDICINE

## 2019-07-19 PROCEDURE — 02HV33Z INSERTION OF INFUSION DEVICE INTO SUPERIOR VENA CAVA, PERCUTANEOUS APPROACH: ICD-10-PCS | Performed by: INTERNAL MEDICINE

## 2019-07-19 PROCEDURE — 97116 GAIT TRAINING THERAPY: CPT

## 2019-07-19 PROCEDURE — 85027 COMPLETE CBC AUTOMATED: CPT | Performed by: INTERNAL MEDICINE

## 2019-07-19 PROCEDURE — 99232 SBSQ HOSP IP/OBS MODERATE 35: CPT | Performed by: INTERNAL MEDICINE

## 2019-07-19 PROCEDURE — 99238 HOSP IP/OBS DSCHRG MGMT 30/<: CPT | Performed by: INTERNAL MEDICINE

## 2019-07-19 RX ORDER — POLYVINYL ALCOHOL 14 MG/ML
1 SOLUTION/ DROPS OPHTHALMIC
Qty: 15 ML | Refills: 0 | Status: SHIPPED | OUTPATIENT
Start: 2019-07-19 | End: 2019-07-22

## 2019-07-19 RX ORDER — ACETAMINOPHEN 325 MG/1
650 TABLET ORAL EVERY 6 HOURS PRN
Status: DISCONTINUED | OUTPATIENT
Start: 2019-07-19 | End: 2019-07-19 | Stop reason: HOSPADM

## 2019-07-19 RX ORDER — SENNOSIDES 8.6 MG
2 TABLET ORAL 2 TIMES DAILY
Qty: 120 EACH | Refills: 0 | Status: SHIPPED | OUTPATIENT
Start: 2019-07-19

## 2019-07-19 RX ORDER — ACETAMINOPHEN 325 MG/1
650 TABLET ORAL EVERY 6 HOURS PRN
Qty: 30 TABLET | Refills: 0 | Status: SHIPPED | OUTPATIENT
Start: 2019-07-19

## 2019-07-19 RX ADMIN — ACYCLOVIR SODIUM 675 MG: 50 INJECTION, SOLUTION INTRAVENOUS at 14:50

## 2019-07-19 RX ADMIN — PANTOPRAZOLE SODIUM 40 MG: 40 TABLET, DELAYED RELEASE ORAL at 05:35

## 2019-07-19 RX ADMIN — ACETAMINOPHEN 650 MG: 325 TABLET, FILM COATED ORAL at 10:30

## 2019-07-19 RX ADMIN — SENNOSIDES 17.2 MG: 8.6 TABLET, FILM COATED ORAL at 08:43

## 2019-07-19 RX ADMIN — ENOXAPARIN SODIUM 40 MG: 40 INJECTION SUBCUTANEOUS at 08:48

## 2019-07-19 RX ADMIN — ACETAMINOPHEN 650 MG: 325 TABLET, FILM COATED ORAL at 02:57

## 2019-07-19 RX ADMIN — LISINOPRIL 10 MG: 10 TABLET ORAL at 08:43

## 2019-07-19 RX ADMIN — ACYCLOVIR SODIUM 675 MG: 50 INJECTION, SOLUTION INTRAVENOUS at 05:35

## 2019-07-19 RX ADMIN — CITALOPRAM HYDROBROMIDE 20 MG: 20 TABLET ORAL at 08:43

## 2019-07-19 NOTE — SOCIAL WORK
[facility] is unable to accept patient for inpatient rehab  CM called and left a voice message for clinical liaison at [address] [place] [address]  Waiting for callback to discuss discharge plan of care

## 2019-07-19 NOTE — SOCIAL WORK
CM spoke with patient at the bedside  Patient updated [facility] do not have any beds available at this time   does have a private bed available for patient  Patient requesting CM contact [facility]  CM attempted to speak with [facility]  CM left a voice message with admissions  Waiting for callback

## 2019-07-19 NOTE — DISCHARGE SUMMARY
Discharge- [facility] 1960, 64 y o  female MRN: [id number]    Unit/Bed#: -01 Encounter: [id number]    Primary Care Provider: Wing Aren MD   Date and time admitted to hospital: 7/15/2019  1:18 PM    * Herpes zoster with meningitis  Assessment & Plan  · Ophthalmology -dry eye syndrome; recommended warm compresses  · Occupational therapy and Physical therapy have signed off on her with no recommendation for further therapy  · Contact precaution  · No photophobia  · Limited range of motion of the neck  · Pain medications PRN  · Vesicular rash along T7 dermatome on left anterior and posterolateral aspect of the thorax-rash improved, but still present  · The bandage that was placed on top of the rash is no longer present because patient removed  · Infectious Diseases recommends 2 week protocol of acyclovir IV-up until 07/28/2019  · Discussed with case management the possibility of discharge today to skilled nurse facility for continuation of treatment-case management narrowing down choices for patient  · PICC line to be inserted today prior to discharge to skilled nurse facility  · Order is in place to remove the PICC line after the completion of the final dose of acyclovir prior to discharge from skilled nurse facility  · BUN is 9 and creatinine is 0 81-discharge orders have been to evaluate blood chemistry and kidney function every other day while being treated with acyclovir  · Patient will be discharged to [facility] skilled nursing facility    Essential hypertension  Assessment & Plan  /65  Discharge on lisinopril 10 mg    Discharging Physician / Practitioner: Edmund Eisenmenger, DO  PCP: Wing Aren MD  Admission Date:   Admission Orders (From admission, onward)    Ordered        07/15/19 1751  Inpatient Admission  Once             Discharge Date: 07/19/19    Resolved Problems  Date Reviewed: 7/19/2019    None          Consultations During Hospital Stay:  · Infectious Diseases, physical therapy, occupational therapy    Procedures Performed:   · Lumbar puncture    Significant Findings / Test Results:   · CSF positive for VZV  · CTA chest PE showed no evidence for pulmonary embolism  · CT head with contrast showed no acute pathology  · CT head without contrast showed no acute intracranial abnormality  · Chest x-ray showed no acute cardiopulmonary disease    Incidental Findings:   · None     Test Results Pending at Discharge (will require follow up): · None     Outpatient Tests Requested:  · Blood chemistry every other day for the duration of the acyclovir treatment until 21/14/3356    Complications:  None    Reason for Admission:  Viral meningitis due to varicella zoster virus    Hospital Course:     Galen Munoz is a 64 y o  female patient who originally presented to the hospital on 7/15/2019 due to an 8/10 headache and neck stiffness that she had been experiencing for the past 5 days accompanied with a vesicular rash all along the T7 dermatome in the anterior and posterior thorax  Lumbar puncture showed evidence of varicella zoster virus in the CSF  Infectious Diseases was consulted  Patient was placed on contact precaution  Patient was administered acyclovir 10 milligrams/kilogram q 8 hours and her kidney function was monitored throughout the treatment  Patient continued to have treatment throughout the duration of her stay  As treatment progressed, symptoms suggest photophobia headache and neck rigidity subsided  Kidney function was intact throughout the treatment  As per the recommendation of Infectious Diseases patient will be discharged to a skilled nurse facility where she will receive acyclovir treatment 3 times a day for a total of 2 weeks until 07/28/2019  A PICC line was placed prior to discharge from [facility] and instructions have been provided for the PICC line to be removed prior to discharge from the skilled nurse facility    Infectious Diseases has stated they will continue following the case after discharge from the hospital     Please see above list of diagnoses and related plan for additional information  Condition at Discharge: stable     Discharge Day Visit / Exam:     Subjective:  Patient complains of minimal headaches  She no longer complains of photophobia and states that she feels comfortable with the blinds open so that she can see outside the windows  She states that she is eating well, urinating well, and has had a bowel movement  Patient also states that she is ambulating well  Case management is working with patient to decide where the patient will be discharged  Review of Systems   Constitutional: Negative for activity change and appetite change  Eyes: Negative for photophobia  Musculoskeletal: Negative for myalgias, neck pain and neck stiffness  Skin: Positive for rash  Vesicular rash present along the T7 dermatome on the anterior and posterior side of the left thorax   All other systems reviewed and are negative  Vitals: Blood Pressure: 125/65 (07/19/19 0843)  Pulse: 64 (07/19/19 0921)  Temperature: 98 2 °F (36 8 °C) (07/19/19 0921)  Temp Source: Oral (07/19/19 0921)  Respirations: 18 (07/19/19 0921)  Height: 5' 3" (160 cm) (07/15/19 1955)  Weight - Scale: 88 kg (194 lb 0 1 oz) (07/15/19 1955)  SpO2: 98 % (07/19/19 0921)       Exam:   Physical Exam   Constitutional: She is oriented to person, place, and time  She appears well-developed and well-nourished  No distress  HENT:   Head: Normocephalic and atraumatic  Eyes: Pupils are equal, round, and reactive to light  Conjunctivae and EOM are normal    Neck: Neck supple  Neck range of motion is improving but still limited on flexion   Cardiovascular: Normal rate, regular rhythm, S1 normal, S2 normal, normal heart sounds and intact distal pulses  Exam reveals no gallop and no friction rub  No murmur heard    Pulmonary/Chest: Effort normal and breath sounds normal  No accessory muscle usage  No respiratory distress  She has no wheezes  She has no rales  Abdominal: Soft  Bowel sounds are normal  She exhibits no distension and no mass  There is no tenderness  Musculoskeletal: Normal range of motion  She exhibits no edema  Lymphadenopathy:     She has no cervical adenopathy  Neurological: She is alert and oriented to person, place, and time  Skin: Skin is warm, dry and intact  Psychiatric: She has a normal mood and affect  Nursing note and vitals reviewed  Discussion with Family:  No discussion with family, with patient stated that she would communicate with her     Discharge instructions/Information to patient and family:   See after visit summary for information provided to patient and family  Provisions for Follow-Up Care:  See after visit summary for information related to follow-up care and any pertinent home health orders  Disposition: For Discharges to [facility] SNF:   · Jade Duncan / Shelley Osullivan at [address] Texted SNF Physician    Planned Readmission:  None     Discharge Statement:  I spent 30 minutes discharging the patient  This time was spent on the day of discharge  I had direct contact with the patient on the day of discharge  Greater than 50% of the total time was spent examining patient, answering all patient questions, arranging and discussing plan of care with patient as well as directly providing post-discharge instructions  Additional time then spent on discharge activities  Discharge Medications:  See after visit summary for reconciled discharge medications provided to patient and family        ** Please Note: This note has been constructed using a voice recognition system **

## 2019-07-19 NOTE — PHYSICAL THERAPY NOTE
PHYSICAL THERAPY TREATMENT NOTE  NAME:  Ab Stevenson  DATE: 07/19/19    Length Of Stay: 4  Performed at least 2 patient identifiers during session: Name and Birthday    TREATMENT:    07/19/19 1348   Pain Assessment   Pain Assessment FLACC   Pain Type Acute pain   Pain Location [facility] Pain Intervention(s) Ambulation/increased activity;Repositioned; Emotional support   Pain Rating: FLACC (Rest) - Face 0   Pain Rating: FLACC (Rest) - Legs 1   Pain Rating: FLACC (Rest) - Activity 1   Pain Rating: FLACC (Rest) - Cry 1   Pain Rating: FLACC (Rest) - Consolability 0   Score: FLACC (Rest) 3   Pain Rating: FLACC (Activity) - Face 0   Pain Rating: FLACC (Activity) - Legs 1   Pain Rating: FLACC (Activity) - Activity 1   Pain Rating: FLACC (Activity) - Cry 1   Pain Rating: FLACC (Activity) - Consolability 1   Score: FLACC (Activity) 4   Restrictions/Precautions   Other Precautions Contact/isolation; Fall Risk;Pain  (contact-shingles)   General   Chart Reviewed Yes   Response to Previous Treatment Patient unable to report, no changes reported from family or staff   Family/Caregiver Present No   Cognition   Overall Cognitive Status WFL   Orientation Level Oriented X4   Memory Within functional limits   Following Commands Follows all commands and directions without difficulty   Subjective   Subjective Pt agreeable to participate, has been amb to/from bathroom w/staff   Bed Mobility   Supine to Sit 6  Modified independent   Additional items Assist x 1   Sit to Supine 6  Modified independent   Additional items Assist x 1   Transfers   Sit to Stand 5  Supervision   Additional items Assist x 1   Stand to Sit 5  Supervision   Additional items Assist x 1   Ambulation/Elevation   Gait pattern Shuffling; Ataxia   Gait Assistance 5  Supervision   Additional items Assist x 1   Assistive Device Straight cane   Distance 50' limited due to contact precautions   Stair Management Assistance 4  Minimal assist  (steadying CG)   Additional items Assist x 1   Stair Management Technique One rail L;With cane; Foreward   Number of Stairs 1  (x2 trials)   Balance   Static Sitting Good   Static Standing Fair +   Ambulatory Fair -   Endurance Deficit   Endurance Deficit Yes   Endurance Deficit Description limited amb distance, self-reported fatigue with mobility    Activity Tolerance   Activity Tolerance Patient limited by fatigue;Patient limited by pain   Medical Staff Made Aware spoke to Chey Angle from case mangement   Nurse Made Aware spoke to [facility]    Exercises   Balance training  TUG 17 seconds first trial, 14 seconds second trial both with cane  4 Item Dynamic Gait Index: 2/3 Gait level surface, 2/3 Change in gait speed, 2/3 Gait with horizontal head turns, 2/3 Gait with vertical head turns, 8/12 total score; (<10/12 indicates increased risk of fall)   Assessment   Prognosis Good   Problem List Decreased strength;Decreased range of motion;Decreased endurance; Impaired balance;Decreased mobility; Decreased coordination;Decreased safety awareness;Pain  (gait deviations)   Assessment Pt did make mobility progress this session  Pt needed less physical A for transfers and less A for ambulation during gait trials  Pt was able to perform higher level activities of stairs this session to mimic KUMAR  However pt did score less than cut off score for TUG and 4-point DGI, indicating that she is at risk for falls  Skilled PT recommended to progress pt toward treatment goals  Recommend continued trials with higher level balance activity, gait training   Goals adjusted to reflect pt progress   Goals   Patient Goals to eventually get back home after rehab   STG Expiration Date 07/27/19   Short Term Goal #1 pt will, Perform all bed mobility tasks modified independent to prepare for transfers, Perform all transfers modified independent to improve independence, Ambulate 250 ft  with least restrictive device w/ modified I w/o LOB for more normalized gait pattern, increase TUG score to consistently <13  5seconds to decrease risk for falls  Improve 4-point DGI score to 10 or more to  risk for falls  Treatment Day 1   Plan   Treatment/Interventions Functional transfer training;LE strengthening/ROM; Elevations; Therapeutic exercise; Endurance training;Patient/family training;Gait training;Bed mobility; Equipment eval/education;Spoke to nursing   Progress Slow progress, decreased activity tolerance   PT Frequency Other (Comment)  (3-5x/wk)   Recommendation   Recommendation Short-term skilled PT   Equipment Recommended Sharon Vasquez PT, DPT

## 2019-07-19 NOTE — SOCIAL WORK
[facility] is unable to accept patient  Patient requesting referrals to [facility]  CM will f/u with patient at bedside re:SNF choices

## 2019-07-19 NOTE — SOCIAL WORK
CM received call from Baljeet Benavides at [facility]  Patient has been approved for inpatient rehab/IV meds at [facility]  CM spoke with patient at the bedside  Patient requesting CM speak with her  over the phone at the bedside  Patient decided to discharge to [facility] versus waiting for [facility]  CM discussed [place] of Choice with patient and   Patient requested discharge to [facility]  Patient's  will transport at discharge  All questions answered at the bedside  Nursing and [facility] updated with plan of care

## 2019-07-19 NOTE — PROCEDURES
Insert PICC line  Date/Time: 7/19/2019 12:30 PM  Performed by: Sherry Devine RN  Authorized by: Kaveh Stuart DO     Patient location:  Bedside  Consent:     Consent obtained:  Written (by MD)  Universal protocol:     Procedure explained and questions answered to patient or proxy's satisfaction: yes      Relevant documents present and verified: yes      Test results available and properly labeled: yes      Radiology Images displayed and confirmed  If images not available, report reviewed: yes      Required blood products, implants, devices, and special equipment available: yes      Site/side marked: yes      Immediately prior to procedure, a time out was called: yes      Patient identity confirmed:  Verbally with patient and arm band  Pre-procedure details:     Hand hygiene: Hand hygiene performed prior to insertion      Sterile barrier technique: All elements of maximal sterile technique followed      Skin preparation:  ChloraPrep    Skin preparation agent: Skin preparation agent completely dried prior to procedure    Indications:     PICC line indications: long term antibiotics    Anesthesia (see MAR for exact dosages):      Anesthesia method:  Local infiltration    Local anesthetic:  Lidocaine 1% w/o epi (2mL)  Procedure details:     Location:  Brachial    Vessel type: vein      Laterality:  Left    Site selection rationale:  Right sided mastectomy w/lymph removal   Left sided mastectomy performed - no lymph nodes removed from left side    Approach: percutaneous technique used      Patient position:  Flat    Procedural supplies:  Single lumen    Catheter size:  4 Fr    Landmarks identified: yes      Ultrasound guidance: yes      Sterile ultrasound techniques: Sterile gel and sterile probe covers were used      Number of attempts:  1    Successful placement: yes      Vessel of catheter tip end:  Sherlock 3CG confirmed    Total catheter length (cm):  51    Catheter out on skin (cm):  0    Max flow rate: 999    Arm circumference:  31  Post-procedure details:     Post-procedure:  Dressing applied and securement device placed    Assessment:  Blood return through all ports and free fluid flow    Post-procedure complications: none      Patient tolerance of procedure:   Tolerated well, no immediate complications

## 2019-07-19 NOTE — ASSESSMENT & PLAN NOTE
· Ophthalmology -dry eye syndrome; recommended warm compresses  · Occupational therapy and Physical therapy have signed off on her with no recommendation for further therapy  · Contact precaution  · No photophobia  · Limited range of motion of the neck  · Pain medications PRN  · Vesicular rash along T7 dermatome on left anterior and posterolateral aspect of the thorax-rash improved, but still present  · The bandage that was placed on top of the rash is no longer present because patient removed  · Infectious Diseases recommends 2 week protocol of acyclovir IV-up until 07/28/2019  · Discussed with case management the possibility of discharge today to skilled nurse facility for continuation of treatment-case management narrowing down choices for patient  · PICC line to be inserted today prior to discharge to skilled nurse facility  · Order is in place to remove the PICC line after the completion of the final dose of acyclovir prior to discharge from skilled nurse facility  · BUN is 9 and creatinine is 0 81-discharge orders have been to evaluate blood chemistry and kidney function every other day while being treated with acyclovir  · Patient will be discharged to [facility] nursing [facility]

## 2019-07-19 NOTE — PLAN OF CARE
Problem: PHYSICAL THERAPY ADULT  Goal: Performs mobility at highest level of function for planned discharge setting  See evaluation for individualized goals  Description  Treatment/Interventions: LE strengthening/ROM, Functional transfer training, Therapeutic exercise, Endurance training, Cognitive reorientation, Patient/family training, Equipment eval/education, Spoke to nursing, Spoke to case management, Gait training, Bed mobility  Equipment Recommended: Chay Moran       See flowsheet documentation for full assessment, interventions and recommendations  Outcome: Progressing  Note:   Prognosis: Good  Problem List: Decreased strength, Decreased range of motion, Decreased endurance, Impaired balance, Decreased mobility, Decreased coordination, Decreased safety awareness, Pain(gait deviations)  Assessment: Pt did make mobility progress this session  Pt needed less physical A for transfers and less A for ambulation during gait trials  Pt was able to perform higher level activities of stairs this session to mimic KUMAR  However pt did score less than cut off score for TUG and 4-point DGI, indicating that she is at risk for falls  Skilled PT recommended to progress pt toward treatment goals  Recommend continued trials with higher level balance activity, gait training  Goals adjusted to reflect pt progress        Recommendation: Short-term skilled PT       See flowsheet documentation for full assessment

## 2019-07-19 NOTE — SOCIAL WORK
CM received call from patient  Patient requesting [facility] at discharge  CM updated clinical liaison from [facility]  Insurance authorization will be submitted by clinical liaison of [facility]

## 2019-07-19 NOTE — PLAN OF CARE
Problem: Potential for Falls  Goal: Patient will remain free of falls  Description  INTERVENTIONS:  - Assess patient frequently for physical needs  -  Identify cognitive and physical deficits and behaviors that affect risk of falls    -  [place] fall precautions as indicated by assessment   - Educate patient/family on patient safety including physical limitations  - Instruct patient to call for assistance with activity based on assessment  - Modify environment to reduce risk of injury  - Consider OT/PT consult to assist with strengthening/mobility  Outcome: Progressing     Problem: DISCHARGE PLANNING - CARE MANAGEMENT  Goal: Discharge to post-acute care or home with appropriate resources  Description  INTERVENTIONS:  - Conduct assessment to determine patient/family and health care team treatment goals, and need for post-acute services based on payer coverage, community resources, and patient preferences, and barriers to discharge  - Address psychosocial, clinical, and financial barriers to discharge as identified in assessment in conjunction with the patient/family and health care team  - Arrange appropriate level of post-acute services according to patients   needs and preference and payer coverage in collaboration with the physician and health care team  - Communicate with and update the patient/family, physician, and health care team regarding progress on the discharge plan  - Arrange appropriate transportation to post-acute venues  Outcome: Progressing

## 2019-07-19 NOTE — SOCIAL WORK
CM spoke with patient at the bedside  Patient updated acute rehab is unable to accept patient for inpatient rehab  Patient requesting referrals be sent to [facility] and [facility]  CM advised patient to continue to review SNF list and choose at least two more places for inpatient rehab/IV treatment  CM sent referrals to [facility] and [facility]  Waiting for reply  [facility] and nursing updated with plan of care

## 2019-07-19 NOTE — SOCIAL WORK
CM received phone call from patient's   Patient's  requesting information on both [facility] and [facility]  CM updated patient's  that [facility] and [facility] are unable to accept due to bed availability  Per patient's request, referrals were sent to [facility]  Patient's aware that [facility] is reviewing and [facility] does not have any beds available  [facility] would be able to accept patient for inpatient rehab and IV medication  CM encouraged patient's  to discuss options with his wife  Patient's  aware that both receiving facilities are preferred SNF  Patient's  will discuss with his wife and update CM with SNF choice

## 2019-07-19 NOTE — PROGRESS NOTES
Progress Note - Infectious Disease   Malgorzata Mccoy 64 y o  female MRN: [id number]  Unit/Bed#: -01 Encounter: [id number]      Impression/Plan:  1  Herpes zoster meningitis   Patient presents with severe 10/10 headache, neck stiffness about 5 days after shingles eruption on left abdomen  CSF VZV +  Rec:  · Acyclovir 10 mg/adjusted kg for BMI >30/q 8 hours on board  · Continue IV acyclovir K 60 STNM and monitor closely for tolerance, improvement and daily kidney function through 7/28/19  · Scripts written for outpatient acyclovir and every other day BMP, discussed with case management, and placed in outpatient folder  · Patient is cleared for PICC placement when feasible for protracted IV acyclovir course  · Monitor Creatine daily  · Monitor headache and clinically  · Monitor temperature and hemodynamics  · Monitor CBC  · Ophthalmology consult appreciated      2   Herpes zoster rash left T7 dermatome   Patient presents with severe 10/10 headache, neck stiffness about 5 days after painful herpes zoster eruption developed on left abdomen  CSF VZV +  Rec:  · Antiviral as above  · Serial exams   · burows soaks as needed to dry blisters     3   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  Rec:  · Serial exam  · Additional supportive care per primary care team      4  Renal Fxn WNL  Rec:  · Monitor creatine closely on 10 day course of IV Acyclovir     5  Mild leukopenia  Consistent with viral infection  Rec:  Monitor CBC     Antiinfective:  Acyclovir IV day 4     Discussed in detail with patient, RN,  and Dr Carlos Alberto Malagon  All of many questions answered and reassurance given  Disposition planning in progress to SNF, but bed availability is questionable  Infectious disease on-call physician available as needed over weekend for any questions    Otherwise infectious disease consultation followup will resume on Monday if patient is still hospitalized      Subjective:  Patient has no fever, chills, sweats; no nausea, vomiting, diarrhea; no cough, shortness of breath; Headache is greatly improved with lights on an room and shades open  patient is able to flex her neck more without any headache  No dysuria  No new symptoms      Objective:  Vitals:  Temp:  [98 2 °F (36 8 °C)-98 6 °F (37 °C)] 98 2 °F (36 8 °C)  HR:  [60-72] 64  Resp:  [18] 18  BP: (125-132)/(65-81) 125/65  SpO2:  [97 %-98 %] 98 %  Temp (24hrs), Av 4 °F (36 9 °C), Min:98 2 °F (36 8 °C), Max:98 6 °F (37 °C)  Current: Temperature: 98 2 °F (36 8 °C)    Physical Exam:   General Appearance:  Alert, interactive, nontoxic, no acute distress  Eyes: EOMI, PERRL, no light sensitivity today, lights on in room and shades open   Neck: Supple, without any significant nuchal rigidity   Throat: Oropharynx moist without lesions  Lungs:   Clear to auscultation bilaterally; no wheezes, rhonchi or rales; respirations unlabored   Heart:  RRR; no murmur, rub or gallop   Abdomen:   Soft, non-tender, non-distended, positive bowel sounds, 2 patches of blistering eruption dry and pink eruption from left abdominal wall to left thoracic back fading slowly  Extremities: No clubbing or cyanosis, no edema, left arm IV site nontender   Skin: No new rashes or lesions        Labs, Imaging, & Other studies:   All pertinent labs and imaging studies were personally reviewed  Results from last 7 days   Lab Units 19  0433 19  0450 19  0353   WBC Thousand/uL 4 06* 3 63* 3 73*   WHITE BLOOD CELL COUNT  x10E3/uL  --   --  3 9   HEMOGLOBIN g/dL 12 0 12 0 12 4   HEMOGLOBIN  g/dL  --   --  12 4   PLATELETS Thousands/uL 241 234 228   PLATELETS  Z14R3/KV  --   --  243     Results from last 7 days   Lab Units 19  0433  19  0353  07/15/19  1406   POTASSIUM mmol/L 4 0   < > 3 9   < > 3 3*   CHLORIDE mmol/L 106   < > 104   < > 100   CO2 mmol/L 27   < > 30   < > 28   BUN mg/dL 9   < > 6   < > 8   CREATININE mg/dL 0 81   < > 0 84   < > 0 90   EGFR ml/min/1 73sq m 81   < > 77   < > 71   CALCIUM mg/dL 8 4   < > 8 3   < > 9 5   AST U/L  --   --  33  --  14   ALT U/L  --   --  35  --  17   ALK PHOS U/L  --   --  79  --  84    < > = values in this interval not displayed  Results from last 7 days   Lab Units 07/15/19  1645 07/15/19  1414 07/15/19  1406   BLOOD CULTURE   --  No Growth at 72 hrs  No Growth at 72 hrs     GRAM STAIN RESULT  2+ Mononuclear Cells  No Polys  No No bacteria seen  --   --

## 2019-07-20 LAB
BACTERIA BLD CULT: NORMAL
BACTERIA BLD CULT: NORMAL

## 2019-07-21 ENCOUNTER — TELEPHONE (OUTPATIENT)
Dept: OTHER | Facility: OTHER | Age: 59
End: 2019-07-21

## 2019-07-22 ENCOUNTER — TELEPHONE (OUTPATIENT)
Dept: INFECTIOUS DISEASES | Facility: CLINIC | Age: 59
End: 2019-07-22

## 2019-07-22 ENCOUNTER — NURSING HOME VISIT (OUTPATIENT)
Dept: GERIATRICS | Facility: OTHER | Age: 59
End: 2019-07-22
Payer: COMMERCIAL

## 2019-07-22 DIAGNOSIS — R26.2 AMBULATORY DYSFUNCTION: ICD-10-CM

## 2019-07-22 DIAGNOSIS — B02.1 HERPES ZOSTER WITH MENINGITIS: Primary | ICD-10-CM

## 2019-07-22 DIAGNOSIS — I10 ESSENTIAL HYPERTENSION: Chronic | ICD-10-CM

## 2019-07-22 PROBLEM — F41.8 DEPRESSION WITH ANXIETY: Status: ACTIVE | Noted: 2019-07-22

## 2019-07-22 PROCEDURE — 99306 1ST NF CARE HIGH MDM 50: CPT | Performed by: FAMILY MEDICINE

## 2019-07-22 NOTE — ASSESSMENT & PLAN NOTE
· T7 dermatomal distribution rash slowly improving  · Patient was evaluated by Infectious Disease due to positive PCR on CSF  · Recommendation was made for IV acyclovir t i d  through 07/28/2019  PICC line was placed in the hospital prior to discharge and instructions included to obtain BMP 3 times per week to monitor kidney function  · Continue contact precautions until lesions have healed  · Continue to monitor skin, monitor for acute mental status changes

## 2019-07-22 NOTE — ASSESSMENT & PLAN NOTE
· Per PT records reviewed, patient using a cane p r n  Lazo Spruce · Recommendation was made for skilled therapy 3-5 times per week  · PT/OT to evaluate and treat as appropriate

## 2019-07-22 NOTE — TELEPHONE ENCOUNTER
Called and spoke to the [facility] on [address] unit where pt is staying  Reported BMP ordered every other day starting today,  added her to the lab drawn list for today and provided me with fax number [phone] to fax order for labwork over to them  Pt does not need f/u in our office

## 2019-07-22 NOTE — PROGRESS NOTES
[address]  [address] [facility]  [phone]    [facility]  HISTORY & PHYSICAL        NAME: Malgorzata Mccoy  AGE: 64 y o  SEX: female  : 1960  DATE OF ENCOUNTER: 19  POS: 31 (SNF)  PCP: Fifi Michaud MD    Chief Complaint     Seen and examined today for admission to short term rehabilitation unit at [address] [place] [address]  History of Present Illness     46-year-old female admitted to [facility] 07/15/2019-2019 with complaints of a severe headache and neck stiffness that she had it been experiencing for 5 days  She also had reported a vesicular rash along the T7 dermatome in the anterior and posterior thorax  She underwent a lumbar puncture which showed evidence of varicella zoster virus in the CSF  Patient was then evaluated by ID, placed on contact precautions and initiated on IV acyclovir every 8 hours with improvement of her symptoms  During hospitalization patient did complain of light sensitivity right greater than left for several days  She does have a history of early glaucoma and follows with [facility] for sight  She was then evaluated by Ophthalmology in the hospital to rule out retinal involvement  She was found to have a dry eye syndrome and recommendation was made for hot compresses t i d  and artificial tears 3 times per day  Per ID recommendations patient is to receive acyclovir 3 times per day for a total of 2 weeks through 2019  She had a PICC placed prior to discharge to [address] [place] [address]  She was evaluated by PT who has recommended short-term rehab services  Today, patient was seen and examined for admission to short-term rehab unit at [address] [place] [address]  She is laying comfortably in bed  Her  at bedside  Patient reports feeling well she denies any pain  States her neck stiffness is very much improved and she still has intermittent headaches that are very mild now  Otherwise offers no complaints        Review of Systems     Review of Systems   Constitutional: Negative for activity change, appetite change, fatigue, fever and unexpected weight change  HENT: Negative for congestion, dental problem, hearing loss and trouble swallowing  Eyes: Negative for visual disturbance  Respiratory: Negative for apnea, cough, choking, chest tightness and shortness of breath  Cardiovascular: Negative for chest pain, palpitations and leg swelling  Gastrointestinal: Negative for abdominal distention, abdominal pain, constipation, diarrhea, nausea and vomiting  Genitourinary: Negative for difficulty urinating, dysuria and flank pain  Musculoskeletal: Positive for gait problem (Chronic underlying ambulatory dysfunction due to history of previous Felizardo Footman for rest syndrome with residual right-sided weakness  )  Negative for arthralgias  Skin: Negative for rash and wound  Neurological: Positive for headaches  Psychiatric/Behavioral: Negative for agitation, behavioral problems, confusion and sleep disturbance  All other systems reviewed and are negative  History   No Known Allergies    Past Medical History:   Diagnosis Date    Breast cancer ([facility] )     RIGHT    Cancer ([facility] )     Guillain Barré syndrome ([facility] )     Hypertension      Past Surgical History:   Procedure Laterality Date    MASTECTOMY, RADICAL Bilateral     UT LAP,APPENDECTOMY N/A 4/11/2017    Procedure: APPENDECTOMY LAPAROSCOPIC;  Surgeon: Brittany Manjarrez DO;  Location: [facility];  Service: General    TOTAL KNEE ARTHROPLASTY Right        No family history on file  Social History     Tobacco Use    Smoking status: Never Smoker    Smokeless tobacco: Never Used   Substance Use Topics    Alcohol use: No    Drug use: Never         She lives at home with her   At baseline has somewhat difficulty ambulating due to residual weakness after coli jessi rest syndrome episode  But ambulates with a cane sometimes      Objective   Vital Signs   BP: 132/68    HR:  76     T:  98 9°     RR:  20     O2Sat:  96% on RA      W:  194 6 lb    Physical Exam   Constitutional: She appears well-developed  No distress  Obese   HENT:   Head: Normocephalic and atraumatic  Mouth/Throat: Oropharynx is clear and moist  No oropharyngeal exudate  Eyes: Pupils are equal, round, and reactive to light  Conjunctivae and EOM are normal    Neck: Neck supple  No JVD present  Cardiovascular: Normal rate, regular rhythm, normal heart sounds and intact distal pulses  Exam reveals no gallop and no friction rub  No murmur heard  Pulmonary/Chest: Effort normal and breath sounds normal  No respiratory distress  Abdominal: Soft  Bowel sounds are normal  She exhibits no distension  There is no tenderness  Musculoskeletal: Normal range of motion  She exhibits no edema, tenderness or deformity  Neurological: She displays normal reflexes  No cranial nerve deficit  Skin: Skin is warm and dry  No rash noted  She is not diaphoretic  No erythema  No pallor  Psychiatric: She has a normal mood and affect  Her behavior is normal  Judgment and thought content normal    Vitals reviewed  Pertinent Laboratory/Diagnostic Studies:     Lab Results   Component Value Date    WBC 4 06 (L) 07/19/2019    HGB 12 0 07/19/2019    HCT 38 5 07/19/2019    MCV 85 07/19/2019     07/19/2019     Lab Results   Component Value Date    CALCIUM 8 4 07/19/2019    K 4 0 07/19/2019    CO2 27 07/19/2019     07/19/2019    BUN 9 07/19/2019    CREATININE 0 81 07/19/2019     No results found for: [id number], TSH  No results found for: HGBA1C      Current Medications     All medications reviewed and updated in [facility] EMR/Chart  Assessment and Plan     Herpes zoster with meningitis  · T7 dermatomal distribution rash slowly improving  · Patient was evaluated by Infectious Disease due to positive PCR on CSF  · Recommendation was made for IV acyclovir t i d  through 07/28/2019    PICC line was placed in the hospital prior to discharge and instructions included to obtain BMP 3 times per week to monitor kidney function  · Continue contact precautions until lesions have healed  · Continue to monitor skin, monitor for acute mental status changes  Essential hypertension  · Blood pressure stable and well controlled  · Continue lisinopril 10 mg daily  · Continue to monitor electrolytes and renal function  Ambulatory dysfunction  · Per PT records reviewed, patient using a cane p r n  Garrison Spinner · Recommendation was made for skilled therapy 3-5 times per week  · PT/OT to evaluate and treat as appropriate  Depression with anxiety  · Mood is stable, continue citalopram and hydroxizine as ordered  Discussed with patient and her  at bedside  Lyn Ruiz MD  Geriatric Medicine  07/22/19    Please note:  Voice-recognition software may have been used in the preparation of this document  Occasional wrong word or "sound-alike" substitutions may have occurred due to the inherent limitations of voice recognition software  Interpretation should be guided by context

## 2019-07-22 NOTE — ASSESSMENT & PLAN NOTE
· Blood pressure stable and well controlled  · Continue lisinopril 10 mg daily  · Continue to monitor electrolytes and renal function

## 2019-07-23 NOTE — TELEPHONE ENCOUNTER
[facility] for first set of labs due 7/22  Pt is on [facility] unit - I spoke with Ginger Lee who will fax over lab results

## 2021-03-10 DIAGNOSIS — Z23 ENCOUNTER FOR IMMUNIZATION: ICD-10-CM

## 2021-03-16 ENCOUNTER — IMMUNIZATIONS (OUTPATIENT)
Dept: FAMILY MEDICINE CLINIC | Facility: HOSPITAL | Age: 61
End: 2021-03-16

## 2021-03-16 DIAGNOSIS — Z23 ENCOUNTER FOR IMMUNIZATION: Primary | ICD-10-CM

## 2021-03-16 PROCEDURE — 0001A SARS-COV-2 / COVID-19 MRNA VACCINE (PFIZER-BIONTECH) 30 MCG: CPT

## 2021-03-16 PROCEDURE — 91300 SARS-COV-2 / COVID-19 MRNA VACCINE (PFIZER-BIONTECH) 30 MCG: CPT

## 2021-03-31 ENCOUNTER — TRANSCRIBE ORDERS (OUTPATIENT)
Dept: ADMINISTRATIVE | Facility: HOSPITAL | Age: 61
End: 2021-03-31

## 2021-03-31 DIAGNOSIS — Z78.0 ASYMPTOMATIC MENOPAUSAL STATE: Primary | ICD-10-CM

## 2021-04-07 ENCOUNTER — IMMUNIZATIONS (OUTPATIENT)
Dept: FAMILY MEDICINE CLINIC | Facility: HOSPITAL | Age: 61
End: 2021-04-07

## 2021-04-07 DIAGNOSIS — Z23 ENCOUNTER FOR IMMUNIZATION: Primary | ICD-10-CM

## 2021-04-07 PROCEDURE — 0002A SARS-COV-2 / COVID-19 MRNA VACCINE (PFIZER-BIONTECH) 30 MCG: CPT

## 2021-04-07 PROCEDURE — 91300 SARS-COV-2 / COVID-19 MRNA VACCINE (PFIZER-BIONTECH) 30 MCG: CPT

## 2021-04-16 ENCOUNTER — TELEPHONE (OUTPATIENT)
Dept: PSYCHIATRY | Facility: CLINIC | Age: 61
End: 2021-04-16

## 2021-12-11 ENCOUNTER — IMMUNIZATIONS (OUTPATIENT)
Dept: FAMILY MEDICINE CLINIC | Facility: HOSPITAL | Age: 61
End: 2021-12-11

## 2021-12-11 DIAGNOSIS — Z23 ENCOUNTER FOR IMMUNIZATION: Primary | ICD-10-CM

## 2021-12-11 PROCEDURE — 91300 COVID-19 PFIZER VACC 0.3 ML: CPT

## 2021-12-11 PROCEDURE — 0001A COVID-19 PFIZER VACC 0.3 ML: CPT

## 2021-12-27 PROCEDURE — 87636 SARSCOV2 & INF A&B AMP PRB: CPT | Performed by: FAMILY MEDICINE

## 2022-01-04 ENCOUNTER — HOSPITAL ENCOUNTER (OUTPATIENT)
Dept: RADIOLOGY | Age: 62
Discharge: HOME | End: 2022-01-04
Payer: COMMERCIAL

## 2022-01-04 DIAGNOSIS — Z78.0 ASYMPTOMATIC MENOPAUSAL STATE: ICD-10-CM

## 2022-01-04 PROCEDURE — 77080 DXA BONE DENSITY AXIAL: CPT

## 2022-05-03 ENCOUNTER — ANNUAL EXAM (OUTPATIENT)
Dept: OBGYN CLINIC | Facility: CLINIC | Age: 62
End: 2022-05-03
Payer: COMMERCIAL

## 2022-05-03 VITALS
SYSTOLIC BLOOD PRESSURE: 110 MMHG | WEIGHT: 225 LBS | DIASTOLIC BLOOD PRESSURE: 70 MMHG | BODY MASS INDEX: 39.87 KG/M2 | HEIGHT: 63 IN

## 2022-05-03 DIAGNOSIS — Z12.31 SCREENING MAMMOGRAM, ENCOUNTER FOR: ICD-10-CM

## 2022-05-03 DIAGNOSIS — Z12.4 SCREENING FOR MALIGNANT NEOPLASM OF THE CERVIX: Primary | ICD-10-CM

## 2022-05-03 DIAGNOSIS — Z01.419 ENCOUNTER FOR GYNECOLOGICAL EXAMINATION WITHOUT ABNORMAL FINDING: ICD-10-CM

## 2022-05-03 PROCEDURE — G0145 SCR C/V CYTO,THINLAYER,RESCR: HCPCS | Performed by: OBSTETRICS & GYNECOLOGY

## 2022-05-03 PROCEDURE — S0610 ANNUAL GYNECOLOGICAL EXAMINA: HCPCS | Performed by: OBSTETRICS & GYNECOLOGY

## 2022-05-03 PROCEDURE — G0476 HPV COMBO ASSAY CA SCREEN: HCPCS | Performed by: OBSTETRICS & GYNECOLOGY

## 2022-05-03 NOTE — PROGRESS NOTES
Assessment/Plan:         Diagnoses and all orders for this visit:    Screening for malignant neoplasm of the cervix  -     Liquid-based pap, screening    Encounter for gynecological examination without abnormal finding    Screening mammogram, encounter for  -     Mammo screening bilateral w 3d & cad; Future          Subjective:      Patient ID: Kaela Ruiz is a 63 y o  female  The patient is a 72-year-old  6 para 26 postmenopausal female who presents for annual exam   She has a history of breast cancer for which had bilateral radical mastectomy with trans flap reconstruction  She denies any gynecologic complaints, denies vaginal or pelvic pain or postmenopausal bleeding  She is fully vaccinated for the coronavirus  She sees a medical oncologist for blood work  We will perform a Pap smear today  She has no history of abnormal Pap smears  She should return in 1 year or as needed  The following portions of the patient's history were reviewed and updated as appropriate: allergies, current medications, past family history, past medical history, past social history, past surgical history and problem list     Review of Systems   Constitutional: Negative for chills, diaphoresis, fatigue, fever and unexpected weight change  HENT: Negative for congestion, sinus pressure, sinus pain, tinnitus and trouble swallowing  Eyes: Negative for visual disturbance  Respiratory: Negative for cough, chest tightness and shortness of breath  Cardiovascular: Negative for chest pain, palpitations and leg swelling  Gastrointestinal: Negative for abdominal distention, abdominal pain, anal bleeding, constipation, diarrhea, nausea, rectal pain and vomiting  Endocrine: Negative for heat intolerance  Genitourinary: Negative for difficulty urinating, dysuria, flank pain, frequency, genital sores, hematuria and urgency  Musculoskeletal: Negative for arthralgias, back pain and joint swelling     Skin: Negative for rash  Allergic/Immunologic: Negative for environmental allergies and food allergies  Neurological: Negative for headaches  Hematological: Negative for adenopathy  Does not bruise/bleed easily  Psychiatric/Behavioral: Negative for decreased concentration and dysphoric mood  The patient is not nervous/anxious  Objective:      /70 (BP Location: Left arm, Patient Position: Sitting, Cuff Size: Standard)   Ht 5' 3" (1 6 m)   Wt 102 kg (225 lb)   BMI 39 86 kg/m²          Physical Exam  Vitals and nursing note reviewed  Exam conducted with a chaperone present  Constitutional:       General: She is not in acute distress  Appearance: Normal appearance  She is normal weight  She is not ill-appearing  HENT:      Head: Normocephalic  Nose: Nose normal       Mouth/Throat:      Mouth: Mucous membranes are moist       Pharynx: Oropharynx is clear  Eyes:      Conjunctiva/sclera: Conjunctivae normal       Pupils: Pupils are equal, round, and reactive to light  Cardiovascular:      Rate and Rhythm: Normal rate and regular rhythm  Pulses: Normal pulses  Pulmonary:      Effort: Pulmonary effort is normal       Breath sounds: Normal breath sounds  Chest:   Breasts: Tray Score is 5  Right: Normal  No mass, nipple discharge, skin change, tenderness or axillary adenopathy  Left: Normal  No mass, nipple discharge, skin change, tenderness or axillary adenopathy  Abdominal:      General: Abdomen is flat  Bowel sounds are normal       Palpations: Abdomen is soft  Genitourinary:     General: Normal vulva  Exam position: Lithotomy position  Tray stage (genital): 5       Vagina: Normal       Cervix: Normal       Uterus: Normal        Adnexa: Right adnexa normal and left adnexa normal       Rectum: Normal    Musculoskeletal:         General: Normal range of motion  Cervical back: Neck supple     Lymphadenopathy:      Upper Body:      Right upper body: No axillary adenopathy  Left upper body: No axillary adenopathy  Skin:     General: Skin is warm and dry  Neurological:      General: No focal deficit present  Mental Status: She is alert     Psychiatric:         Mood and Affect: Mood normal

## 2022-05-06 LAB
HPV HR 12 DNA CVX QL NAA+PROBE: NEGATIVE
HPV16 DNA CVX QL NAA+PROBE: NEGATIVE
HPV18 DNA CVX QL NAA+PROBE: NEGATIVE

## 2022-05-11 LAB
LAB AP GYN PRIMARY INTERPRETATION: NORMAL
Lab: NORMAL

## 2022-11-08 ENCOUNTER — TELEPHONE (OUTPATIENT)
Dept: GASTROENTEROLOGY | Facility: CLINIC | Age: 62
End: 2022-11-08

## 2022-11-08 NOTE — TELEPHONE ENCOUNTER
Recall ltr for colon with  Dr Elizabeth Razo due to hx of hyperplastic polyps , hx breast ca, due 12/13/22

## 2024-04-11 ENCOUNTER — TELEPHONE (OUTPATIENT)
Age: 64
End: 2024-04-11

## 2024-04-11 NOTE — TELEPHONE ENCOUNTER
Caller: Kd Soriano/    Doctor: Dr. Fowler    Reason for call: Wife is scheduled for nail care only/not diabetic and  wants to know if insurance does not cover this can he pay the non covered foot care fee of $40.00 that is offered by other DPM's in [facility]? Not sure if Dr. Fowler offers this service. Please call back and advise. Thanks    Call back#: [phone]

## 2024-04-12 NOTE — TELEPHONE ENCOUNTER
Yes, there is a self-pay nail care option. The charge is $40.00. Please let us know when booking the appt or at time of arrival for the appointment.

## 2024-07-09 ENCOUNTER — TELEPHONE (OUTPATIENT)
Age: 64
End: 2024-07-09

## 2024-07-09 NOTE — TELEPHONE ENCOUNTER
Received call from patients PCP office regarding bariatric surgery.    Patient told PCP that she was informed she does not qualify for gastric sleeve. PCP calling to try to find out why. Was told to contact Dr Aston Luis.    I was not able to find any information regarding this in chart.     Please contact Dr Cortés office with any reasoning why patient does not qualify for Gastric sleeve.    #[phone]

## (undated) DEVICE — NEEDLE 22 G X 1 1/2 SAFETY

## (undated) DEVICE — HARMONIC ACE 5MM DIAMETER SHEARS 36CM SHAFT LENGTH + ADAPTIVE TISSUE TECHNOLOGY FOR USE WITH GENERATOR G11: Brand: HARMONIC ACE

## (undated) DEVICE — ADHESIVE SKN CLSR HISTOACRYL FLEX 0.5ML LF

## (undated) DEVICE — ETS45 RELOAD STANDARD 45MM: Brand: ENDOPATH

## (undated) DEVICE — IRRIG ENDO FLO TUBING

## (undated) DEVICE — SCD SEQUENTIAL COMPRESSION COMFORT SLEEVE MEDIUM KNEE LENGTH: Brand: KENDALL SCD

## (undated) DEVICE — ENDOPATH ETS-FLEX45 ARTICULATING ENDOSCOPIC LINEAR CUTTER, NO RELOAD: Brand: ENDOPATH

## (undated) DEVICE — ALLENTOWN LAP CHOLE APP PACK: Brand: CARDINAL HEALTH

## (undated) DEVICE — LIGHT HANDLE COVER SLEEVE DISP BLUE STELLAR

## (undated) DEVICE — ENDOPOUCH RETRIEVER SPECIMEN RETRIEVAL BAGS: Brand: ENDOPOUCH RETRIEVER

## (undated) DEVICE — VIAL DECANTER

## (undated) DEVICE — INTENDED FOR TISSUE SEPARATION, AND OTHER PROCEDURES THAT REQUIRE A SHARP SURGICAL BLADE TO PUNCTURE OR CUT.: Brand: BARD-PARKER SAFETY BLADES SIZE 11, STERILE

## (undated) DEVICE — TRAY FOLEY 16FR URIMETER SURESTEP

## (undated) DEVICE — CHLORAPREP HI-LITE 26ML ORANGE

## (undated) DEVICE — ENDOPATH XCEL BLADELESS TROCARS WITH STABILITY SLEEVES: Brand: ENDOPATH XCEL

## (undated) DEVICE — TOWEL SET X-RAY

## (undated) DEVICE — ENDOPATH XCEL UNIVERSAL TROCAR STABLILITY SLEEVES: Brand: ENDOPATH XCEL

## (undated) DEVICE — REM POLYHESIVE ADULT PATIENT RETURN ELECTRODE: Brand: VALLEYLAB

## (undated) DEVICE — GLOVE SRG BIOGEL ORTHOPEDIC 8

## (undated) DEVICE — SUT VICRYL 0 UR-6 27 IN J603H

## (undated) DEVICE — SUT MONOCRYL 4-0 PS-2 27 IN Y426H